# Patient Record
Sex: FEMALE | Race: OTHER | HISPANIC OR LATINO | URBAN - METROPOLITAN AREA
[De-identification: names, ages, dates, MRNs, and addresses within clinical notes are randomized per-mention and may not be internally consistent; named-entity substitution may affect disease eponyms.]

---

## 2017-10-12 ENCOUNTER — OUTPATIENT (OUTPATIENT)
Dept: OUTPATIENT SERVICES | Facility: HOSPITAL | Age: 72
LOS: 1 days | Discharge: HOME | End: 2017-10-12

## 2017-10-17 DIAGNOSIS — K44.9 DIAPHRAGMATIC HERNIA WITHOUT OBSTRUCTION OR GANGRENE: ICD-10-CM

## 2017-10-17 DIAGNOSIS — I10 ESSENTIAL (PRIMARY) HYPERTENSION: ICD-10-CM

## 2017-10-17 DIAGNOSIS — H33.8 OTHER RETINAL DETACHMENTS: ICD-10-CM

## 2017-10-17 DIAGNOSIS — M19.90 UNSPECIFIED OSTEOARTHRITIS, UNSPECIFIED SITE: ICD-10-CM

## 2017-10-17 DIAGNOSIS — Z79.82 LONG TERM (CURRENT) USE OF ASPIRIN: ICD-10-CM

## 2017-10-17 DIAGNOSIS — H35.341 MACULAR CYST, HOLE, OR PSEUDOHOLE, RIGHT EYE: ICD-10-CM

## 2018-04-19 ENCOUNTER — OUTPATIENT (OUTPATIENT)
Dept: OUTPATIENT SERVICES | Facility: HOSPITAL | Age: 73
LOS: 1 days | Discharge: HOME | End: 2018-04-19

## 2019-07-10 PROBLEM — Z00.00 ENCOUNTER FOR PREVENTIVE HEALTH EXAMINATION: Status: ACTIVE | Noted: 2019-07-10

## 2019-07-11 ENCOUNTER — TRANSCRIPTION ENCOUNTER (OUTPATIENT)
Age: 74
End: 2019-07-11

## 2019-07-11 ENCOUNTER — APPOINTMENT (OUTPATIENT)
Dept: PULMONOLOGY | Facility: CLINIC | Age: 74
End: 2019-07-11
Payer: MEDICARE

## 2019-07-11 VITALS
DIASTOLIC BLOOD PRESSURE: 65 MMHG | HEIGHT: 62 IN | HEART RATE: 57 BPM | BODY MASS INDEX: 26.5 KG/M2 | SYSTOLIC BLOOD PRESSURE: 147 MMHG | WEIGHT: 144 LBS | OXYGEN SATURATION: 95 %

## 2019-07-11 DIAGNOSIS — G47.00 INSOMNIA, UNSPECIFIED: ICD-10-CM

## 2019-07-11 DIAGNOSIS — Z78.9 OTHER SPECIFIED HEALTH STATUS: ICD-10-CM

## 2019-07-11 DIAGNOSIS — K46.9 UNSPECIFIED ABDOMINAL HERNIA W/OUT OBSTRUCTION OR GANGRENE: ICD-10-CM

## 2019-07-11 DIAGNOSIS — Z86.79 PERSONAL HISTORY OF OTHER DISEASES OF THE CIRCULATORY SYSTEM: ICD-10-CM

## 2019-07-11 DIAGNOSIS — R07.9 CHEST PAIN, UNSPECIFIED: ICD-10-CM

## 2019-07-11 PROCEDURE — 94060 EVALUATION OF WHEEZING: CPT | Mod: 59

## 2019-07-11 PROCEDURE — 99203 OFFICE O/P NEW LOW 30 MIN: CPT | Mod: 25

## 2019-07-11 PROCEDURE — 94729 DIFFUSING CAPACITY: CPT | Mod: 59

## 2019-07-11 PROCEDURE — 94727 GAS DIL/WSHOT DETER LNG VOL: CPT | Mod: 59

## 2019-07-11 NOTE — REVIEW OF SYSTEMS
[Chest Discomfort] : chest discomfort [Back Pain] : ~T back pain [Difficulty Initiating Sleep] : difficulty falling asleep [Difficulty Maintaining Sleep] : difficulty maintaining sleep [Negative] : Sleep Disorder

## 2019-07-11 NOTE — HISTORY OF PRESENT ILLNESS
[FreeTextEntry1] : The patient is a 73-year-old  lady with history of left posterior chest pain for the last 3-4 months. The pain is not related to exertion. It occurs sometimes during the day. She also has low back pain.\par She had a full cardiac evaluation including a thallium stress test done on 21 June. Patient is a nonsmoker. She is active. She does housework regularly.\par The daughter states the patient complains of insomnia. She goes to bed around 12 midnight and falls asleep at around 4 AM and sleeps till about 8 AM. She drinks several cups of coffee a day. Most of the coffee she drinks each in the evening hours.\par She has no history of dyspnea. There is no history of cough.\par She was a homemaker. There is no occupational history.\par

## 2019-07-11 NOTE — DISCUSSION/SUMMARY
[FreeTextEntry1] : Patient has chest wall pain mostly likely related to arthritis of the spine.\par Will do a chest x-ray.\par The patient was advised to try stretching and relaxation exercises. Patient is advised to try yoga.\par The patient has insomnia most likely related to excess caffeine intake. She was advised to cut out caffeine intake and take magnesium and melatonin at night.

## 2019-07-11 NOTE — PHYSICAL EXAM
[General Appearance - Well Developed] : well developed [Normal Appearance] : normal appearance [Well Groomed] : well groomed [General Appearance - Well Nourished] : well nourished [No Deformities] : no deformities [General Appearance - In No Acute Distress] : no acute distress [Normal Conjunctiva] : the conjunctiva exhibited no abnormalities [Eyelids - No Xanthelasma] : the eyelids demonstrated no xanthelasmas [Normal Oropharynx] : normal oropharynx [IV] : IV [Jugular Venous Distention Increased] : there was no jugular-venous distention [Neck Appearance] : the appearance of the neck was normal [Neck Cervical Mass (___cm)] : no neck mass was observed [Thyroid Diffuse Enlargement] : the thyroid was not enlarged [Thyroid Nodule] : there were no palpable thyroid nodules [Heart Rate And Rhythm] : heart rate and rhythm were normal [Heart Sounds] : normal S1 and S2 [Murmurs] : no murmurs present [Respiration, Rhythm And Depth] : normal respiratory rhythm and effort [Exaggerated Use Of Accessory Muscles For Inspiration] : no accessory muscle use [Auscultation Breath Sounds / Voice Sounds] : lungs were clear to auscultation bilaterally [Abdomen Tenderness] : non-tender [Abdomen Soft] : soft [Abdomen Mass (___ Cm)] : no abdominal mass palpated [Abnormal Walk] : normal gait [Nail Clubbing] : no clubbing of the fingernails [Gait - Sufficient For Exercise Testing] : the gait was sufficient for exercise testing [Petechial Hemorrhages (___cm)] : no petechial hemorrhages [Cyanosis, Localized] : no localized cyanosis [Skin Color & Pigmentation] : normal skin color and pigmentation [Skin Turgor] : normal skin turgor [] : no rash [Deep Tendon Reflexes (DTR)] : deep tendon reflexes were 2+ and symmetric [Sensation] : the sensory exam was normal to light touch and pinprick [No Focal Deficits] : no focal deficits [Oriented To Time, Place, And Person] : oriented to person, place, and time [Affect] : the affect was normal [Impaired Insight] : insight and judgment were intact

## 2019-07-11 NOTE — PROCEDURE
[FreeTextEntry1] : The pulmonary function testing done revealed restrictive impairment and mild reduction in DLCO.

## 2019-08-08 ENCOUNTER — APPOINTMENT (OUTPATIENT)
Dept: PULMONOLOGY | Facility: CLINIC | Age: 74
End: 2019-08-08

## 2022-09-01 ENCOUNTER — INPATIENT (INPATIENT)
Facility: HOSPITAL | Age: 77
LOS: 0 days | Discharge: HOME | DRG: 178 | End: 2022-09-02
Attending: INTERNAL MEDICINE | Admitting: INTERNAL MEDICINE
Payer: MEDICARE

## 2022-09-01 VITALS
OXYGEN SATURATION: 96 % | RESPIRATION RATE: 16 BRPM | DIASTOLIC BLOOD PRESSURE: 67 MMHG | HEIGHT: 62 IN | HEART RATE: 74 BPM | SYSTOLIC BLOOD PRESSURE: 182 MMHG | TEMPERATURE: 98 F | WEIGHT: 143.08 LBS

## 2022-09-01 DIAGNOSIS — K21.9 GASTRO-ESOPHAGEAL REFLUX DISEASE WITHOUT ESOPHAGITIS: ICD-10-CM

## 2022-09-01 DIAGNOSIS — Z29.9 ENCOUNTER FOR PROPHYLACTIC MEASURES, UNSPECIFIED: ICD-10-CM

## 2022-09-01 DIAGNOSIS — I10 ESSENTIAL (PRIMARY) HYPERTENSION: ICD-10-CM

## 2022-09-01 DIAGNOSIS — Z71.89 OTHER SPECIFIED COUNSELING: ICD-10-CM

## 2022-09-01 DIAGNOSIS — R07.9 CHEST PAIN, UNSPECIFIED: ICD-10-CM

## 2022-09-01 DIAGNOSIS — E78.5 HYPERLIPIDEMIA, UNSPECIFIED: ICD-10-CM

## 2022-09-01 DIAGNOSIS — U07.1 COVID-19: ICD-10-CM

## 2022-09-01 DIAGNOSIS — R07.89 OTHER CHEST PAIN: ICD-10-CM

## 2022-09-01 LAB
ALBUMIN SERPL ELPH-MCNC: 3.5 G/DL — SIGNIFICANT CHANGE UP (ref 3.5–5)
ALP SERPL-CCNC: 62 U/L — SIGNIFICANT CHANGE UP (ref 40–120)
ALT FLD-CCNC: 19 U/L DA — SIGNIFICANT CHANGE UP (ref 10–60)
ANION GAP SERPL CALC-SCNC: 6 MMOL/L — SIGNIFICANT CHANGE UP (ref 5–17)
AST SERPL-CCNC: 24 U/L — SIGNIFICANT CHANGE UP (ref 10–40)
BASOPHILS # BLD AUTO: 0.03 K/UL — SIGNIFICANT CHANGE UP (ref 0–0.2)
BASOPHILS NFR BLD AUTO: 0.4 % — SIGNIFICANT CHANGE UP (ref 0–2)
BILIRUB SERPL-MCNC: 0.4 MG/DL — SIGNIFICANT CHANGE UP (ref 0.2–1.2)
BUN SERPL-MCNC: 25 MG/DL — HIGH (ref 7–18)
CALCIUM SERPL-MCNC: 9.4 MG/DL — SIGNIFICANT CHANGE UP (ref 8.4–10.5)
CHLORIDE SERPL-SCNC: 107 MMOL/L — SIGNIFICANT CHANGE UP (ref 96–108)
CO2 SERPL-SCNC: 25 MMOL/L — SIGNIFICANT CHANGE UP (ref 22–31)
CREAT SERPL-MCNC: 0.91 MG/DL — SIGNIFICANT CHANGE UP (ref 0.5–1.3)
EGFR: 65 ML/MIN/1.73M2 — SIGNIFICANT CHANGE UP
EOSINOPHIL # BLD AUTO: 0.05 K/UL — SIGNIFICANT CHANGE UP (ref 0–0.5)
EOSINOPHIL NFR BLD AUTO: 0.6 % — SIGNIFICANT CHANGE UP (ref 0–6)
GLUCOSE SERPL-MCNC: 106 MG/DL — HIGH (ref 70–99)
HCT VFR BLD CALC: 39.2 % — SIGNIFICANT CHANGE UP (ref 34.5–45)
HGB BLD-MCNC: 13.1 G/DL — SIGNIFICANT CHANGE UP (ref 11.5–15.5)
IMM GRANULOCYTES NFR BLD AUTO: 0.6 % — SIGNIFICANT CHANGE UP (ref 0–1.5)
LYMPHOCYTES # BLD AUTO: 1.26 K/UL — SIGNIFICANT CHANGE UP (ref 1–3.3)
LYMPHOCYTES # BLD AUTO: 15.6 % — SIGNIFICANT CHANGE UP (ref 13–44)
MCHC RBC-ENTMCNC: 30.4 PG — SIGNIFICANT CHANGE UP (ref 27–34)
MCHC RBC-ENTMCNC: 33.4 GM/DL — SIGNIFICANT CHANGE UP (ref 32–36)
MCV RBC AUTO: 91 FL — SIGNIFICANT CHANGE UP (ref 80–100)
MONOCYTES # BLD AUTO: 0.44 K/UL — SIGNIFICANT CHANGE UP (ref 0–0.9)
MONOCYTES NFR BLD AUTO: 5.4 % — SIGNIFICANT CHANGE UP (ref 2–14)
NEUTROPHILS # BLD AUTO: 6.25 K/UL — SIGNIFICANT CHANGE UP (ref 1.8–7.4)
NEUTROPHILS NFR BLD AUTO: 77.4 % — HIGH (ref 43–77)
NRBC # BLD: 0 /100 WBCS — SIGNIFICANT CHANGE UP (ref 0–0)
PLATELET # BLD AUTO: 261 K/UL — SIGNIFICANT CHANGE UP (ref 150–400)
POTASSIUM SERPL-MCNC: 4.6 MMOL/L — SIGNIFICANT CHANGE UP (ref 3.5–5.3)
POTASSIUM SERPL-SCNC: 4.6 MMOL/L — SIGNIFICANT CHANGE UP (ref 3.5–5.3)
PROT SERPL-MCNC: 7.4 G/DL — SIGNIFICANT CHANGE UP (ref 6–8.3)
RBC # BLD: 4.31 M/UL — SIGNIFICANT CHANGE UP (ref 3.8–5.2)
RBC # FLD: 12.8 % — SIGNIFICANT CHANGE UP (ref 10.3–14.5)
SARS-COV-2 RNA SPEC QL NAA+PROBE: DETECTED
SODIUM SERPL-SCNC: 138 MMOL/L — SIGNIFICANT CHANGE UP (ref 135–145)
TROPONIN I, HIGH SENSITIVITY RESULT: 8.2 NG/L — SIGNIFICANT CHANGE UP
TROPONIN I, HIGH SENSITIVITY RESULT: 8.9 NG/L — SIGNIFICANT CHANGE UP
WBC # BLD: 8.08 K/UL — SIGNIFICANT CHANGE UP (ref 3.8–10.5)
WBC # FLD AUTO: 8.08 K/UL — SIGNIFICANT CHANGE UP (ref 3.8–10.5)

## 2022-09-01 PROCEDURE — 71045 X-RAY EXAM CHEST 1 VIEW: CPT | Mod: 26

## 2022-09-01 PROCEDURE — 74174 CTA ABD&PLVS W/CONTRAST: CPT | Mod: 26,MA

## 2022-09-01 PROCEDURE — 99285 EMERGENCY DEPT VISIT HI MDM: CPT

## 2022-09-01 PROCEDURE — 71275 CT ANGIOGRAPHY CHEST: CPT | Mod: 26,MA

## 2022-09-01 RX ORDER — VALSARTAN 80 MG/1
320 TABLET ORAL DAILY
Refills: 0 | Status: DISCONTINUED | OUTPATIENT
Start: 2022-09-02 | End: 2022-09-02

## 2022-09-01 RX ORDER — ASPIRIN/CALCIUM CARB/MAGNESIUM 324 MG
81 TABLET ORAL DAILY
Refills: 0 | Status: DISCONTINUED | OUTPATIENT
Start: 2022-09-01 | End: 2022-09-02

## 2022-09-01 RX ORDER — AMLODIPINE BESYLATE 2.5 MG/1
1 TABLET ORAL
Qty: 0 | Refills: 0 | DISCHARGE

## 2022-09-01 RX ORDER — PANTOPRAZOLE SODIUM 20 MG/1
40 TABLET, DELAYED RELEASE ORAL
Refills: 0 | Status: DISCONTINUED | OUTPATIENT
Start: 2022-09-01 | End: 2022-09-02

## 2022-09-01 RX ORDER — AMLODIPINE BESYLATE 2.5 MG/1
5 TABLET ORAL DAILY
Refills: 0 | Status: DISCONTINUED | OUTPATIENT
Start: 2022-09-01 | End: 2022-09-02

## 2022-09-01 RX ORDER — ENOXAPARIN SODIUM 100 MG/ML
40 INJECTION SUBCUTANEOUS ONCE
Refills: 0 | Status: COMPLETED | OUTPATIENT
Start: 2022-09-01 | End: 2022-09-01

## 2022-09-01 RX ORDER — VALSARTAN 80 MG/1
1 TABLET ORAL
Qty: 0 | Refills: 0 | DISCHARGE

## 2022-09-01 RX ORDER — HYDROCHLOROTHIAZIDE 25 MG
25 TABLET ORAL DAILY
Refills: 0 | Status: DISCONTINUED | OUTPATIENT
Start: 2022-09-01 | End: 2022-09-02

## 2022-09-01 RX ORDER — METOPROLOL TARTRATE 50 MG
25 TABLET ORAL
Refills: 0 | Status: DISCONTINUED | OUTPATIENT
Start: 2022-09-01 | End: 2022-09-02

## 2022-09-01 RX ORDER — ACETAMINOPHEN 500 MG
650 TABLET ORAL EVERY 6 HOURS
Refills: 0 | Status: DISCONTINUED | OUTPATIENT
Start: 2022-09-01 | End: 2022-09-02

## 2022-09-01 RX ORDER — ATORVASTATIN CALCIUM 80 MG/1
40 TABLET, FILM COATED ORAL AT BEDTIME
Refills: 0 | Status: DISCONTINUED | OUTPATIENT
Start: 2022-09-01 | End: 2022-09-02

## 2022-09-01 RX ADMIN — ENOXAPARIN SODIUM 40 MILLIGRAM(S): 100 INJECTION SUBCUTANEOUS at 19:30

## 2022-09-01 RX ADMIN — PANTOPRAZOLE SODIUM 40 MILLIGRAM(S): 20 TABLET, DELAYED RELEASE ORAL at 19:31

## 2022-09-01 RX ADMIN — ATORVASTATIN CALCIUM 40 MILLIGRAM(S): 80 TABLET, FILM COATED ORAL at 22:39

## 2022-09-01 RX ADMIN — Medication 81 MILLIGRAM(S): at 19:30

## 2022-09-01 NOTE — H&P ADULT - PROBLEM SELECTOR PLAN 4
H/o small hiatal hernia, experiences heart burn on and off at home  Not taking any medications at home for heartburn  C/w Protonix 40mg H/o small hiatal hernia, experiences heart burn on and off at home  CT showing small pancreatic cyst  Not taking any medications at home for heartburn  C/w Protonix 40mg  f/u lipase  maalox

## 2022-09-01 NOTE — H&P ADULT - PROBLEM SELECTOR PLAN 2
Reports history of HLD  Not on any medications at home Reports history of HLD  Not on any medications at home  start atorva 40  f/u lipid panel

## 2022-09-01 NOTE — H&P ADULT - HISTORY OF PRESENT ILLNESS
This is a 77 y/o female w/ pmhx of HTN, HLD, GERD, small hiatal hernia, p/w chest pain. The patient had interscapular pain which started on 8/31/22 at 11 PM. She woke up this morning (9/1/22) with burning chest pain, diaphoresis, and palpitations. Pain is aggravated by lying down. It is not aggravated by exertion. Pain is non radiating. No alleviating factors present. Headache associated with the pain. Pt has never experienced such symptoms before. Pt denies any chills, fevers, nausea, vomiting, SOB, dizziness, weakness, diarrhea, constipation dysuria, or numbness/tingling.  75 y/o female, from home, ambu w/ out assist w/ pmhx of HTN, HLD, GERD, small hiatal hernia, p/w chest pain. The patient had interscapular pain which started on 8/31/22 at 11 PM. She woke up this morning (9/1/22) with burning chest pain, diaphoresis, and palpitations. Pain is aggravated by lying down. It is not aggravated by exertion. Pain is non radiating. No alleviating factors present. Headache associated with the pain. Pt has never experienced such symptoms before. Pt denies any chills, fevers, nausea, vomiting, SOB, dizziness, weakness, diarrhea, constipation dysuria, or numbness/tingling.

## 2022-09-01 NOTE — ED PROVIDER NOTE - OBJECTIVE STATEMENT
76 year old patient with pmhx of hld and htn called ambulance for intermittent palpitations and some chest pain radiating to back. Recalls symptoms lasted last night 11 pm. Feeling fine now in ED. Dr Joe Meza for cardiologist. TWYLA.

## 2022-09-01 NOTE — H&P ADULT - PROBLEM SELECTOR PLAN 1
Pt presents with a burning, non-radiating chest pain. not worse with exertion. had back px last night which self resolved.  Ddx. R/o ACS vs GERD   CT chest/ aorta: No aortic dissection  CT angio: Coronary atherosclerosis  - EKG NSR  - Trend troponin to peak  - F/u Echo  - Monitor on Tele for ACS  Cardio Dr. Gonzalez Pt presents with a burning, non-radiating chest pain, not worse with exertion. had back px last night which self resolved  Ddx. R/o ACS vs GERD vs aortic dissection  CT chest/ aorta: No aortic dissection  CT angio: Coronary atherosclerosis  - EKG NSR  - Trend troponin to peak  - F/u Echo  - Monitor on Tele  Cardio Dr. Gonzalez Pt presents with a burning, non-radiating chest pain, not worse with exertion. had back px last night which self resolved  Ddx. R/o ACS vs GERD vs aortic dissection  CT chest/ aorta: No aortic dissection  CT angio: Coronary atherosclerosis  - EKG NSR  - Trend troponin to peak  - F/u Echo  - Monitor on Tele  - f/u lipid and a1c, DASH diet  Cardio Dr. Gonzalez

## 2022-09-01 NOTE — H&P ADULT - ATTENDING COMMENTS
75 y/o female, from home, ambu w/ out assist w/ pmhx of HTN, HLD, GERD, small hiatal hernia, p/w chest pain. The patient had interscapular pain which started on 8/31/22 at 11 PM. She woke up this morning (9/1/22) with burning chest pain, diaphoresis, and palpitations. Pain is aggravated by lying down. It is not aggravated by exertion. Pain is non radiating. No alleviating factors present. Headache associated with the pain. Pt has never experienced such symptoms before. Pt denies any chills, fevers, nausea, vomiting, SOB, dizziness, weakness, diarrhea, constipation dysuria, or numbness/tingling.      assessment   --- chest pain,  r/o acs, pe r/o, pos acute gastritis, esophagitis, r/o pud, pancreatic cyst, covid 19 positive, h/o HTN, HLD, GERD, small hiatal hernia    plan  --  adm to tele, acs protocol, lopressor, aspirin, statin, hold rendesivir and decadron since O2 > 94 on ra and pt afebrile, vit c, vit d, zinc, cont preadmit home meds, gi and dvt prophylaxis    cbc, bmp, mg, phos, lipid, tsh, ce q8 x3    airborne and contact isolation    echo      cardio cons  gi cons

## 2022-09-01 NOTE — PATIENT PROFILE ADULT - FALL HARM RISK - UNIVERSAL INTERVENTIONS
Bed in lowest position, wheels locked, appropriate side rails in place/Call bell, personal items and telephone in reach/Instruct patient to call for assistance before getting out of bed or chair/Non-slip footwear when patient is out of bed/Waka to call system/Physically safe environment - no spills, clutter or unnecessary equipment/Purposeful Proactive Rounding/Room/bathroom lighting operational, light cord in reach

## 2022-09-01 NOTE — ED PROVIDER NOTE - PROGRESS NOTE DETAILS
phil strange md for dr ervin, reangie for med admit - they will see pt edvin. discussed the case with the admitting MD who accepts the patient for admission

## 2022-09-01 NOTE — ED ADULT NURSE NOTE - NSFALLRSKUNASSIST_ED_ALL_ED
Pt had 2 periods in February and no period in March. Pt is concerned since her periods have always been regular. Pt had 3 negative home pregnancy tests and is requesting a serum preg test,stating she had a negative home preg test with last pregnancy. Lab is ordered. Pt states she is not trying to conceive , but does not wish to use contraception. Pt will go to [facility].   no

## 2022-09-01 NOTE — H&P ADULT - PROBLEM SELECTOR PLAN 5
Covid + (9/1)  Asymptomatic  Not requiring Rem/dec Covid + (9/1)  Asymptomatic, on RA  unkwn vax satus  No indication for Rem/dec

## 2022-09-01 NOTE — ED PROVIDER NOTE - NS_ATTENDINGSCRIBE_ED_ALL_ED
Pt notified via emsg   I personally performed the service described in the documentation recorded by the scribe in my presence, and it accurately and completely records my words and actions.

## 2022-09-01 NOTE — H&P ADULT - NSHPPHYSICALEXAM_GEN_ALL_CORE
GENERAL: NAD, mildly obese  HEAD:  Atraumatic, Normocephalic  EYES: EOMI, PERRLA, conjunctiva and sclera clear  NECK: Supple, No JVD  CHEST/LUNG: Clear to auscultation bilaterally; No wheeze  HEART: Regular rate and rhythm; No murmurs, rubs, or gallops  ABDOMEN: Soft, Nontender, Nondistended; Bowel sounds present  EXTREMITIES:  2+ Peripheral Pulses, No clubbing, cyanosis, or edema  PSYCH: AAOx3  NEUROLOGY: non-focal  SKIN: No rashes or lesions

## 2022-09-01 NOTE — H&P ADULT - ASSESSMENT
This is a 75 y/o female w/ pmhx of HTN, HLD, GERD, small hiatal hernia, p/w chest pain. Admitted for r/o ACS.

## 2022-09-01 NOTE — H&P ADULT - NSHPLABSRESULTS_GEN_ALL_CORE
LABS:                        13.1   8.08  )-----------( 261      ( 01 Sep 2022 14:58 )             39.2     09-01    138  |  107  |  25<H>  ----------------------------<  106<H>  4.6   |  25  |  0.91    Ca    9.4      01 Sep 2022 14:58    TPro  7.4  /  Alb  3.5  /  TBili  0.4  /  DBili  x   /  AST  24  /  ALT  19  /  AlkPhos  62  09-01        LIVER FUNCTIONS - ( 01 Sep 2022 14:58 )  Alb: 3.5 g/dL / Pro: 7.4 g/dL / ALK PHOS: 62 U/L / ALT: 19 U/L DA / AST: 24 U/L / GGT: x           EKG: NSR no ST elevation/depression. no TWI    < from: CT Angio Chest Aorta w/wo IV Cont (09.01.22 @ 17:44) >      IMPRESSION:    No aortic dissection.    Coronary atherosclerosis.    Nonspecific 0.8 cm cystic lesion within the pancreatic body.    --- End of Report ---    < end of copied text >

## 2022-09-01 NOTE — ED PROVIDER NOTE - CARE PLAN
1 Principal Discharge DX:	Chest discomfort  Secondary Diagnosis:	2019 novel coronavirus disease (COVID-19)  Secondary Diagnosis:	Lesion of pancreas

## 2022-09-01 NOTE — H&P ADULT - PROBLEM SELECTOR PLAN 3
Pt takes metoprolol succinate, amlodipine, chlorthalidone, valsartan, hydrochlorothiazide  Continue with home medications Pt takes metoprolol succinate, amlodipine, chlorthalidone, valsartan, hydrochlorothiazide  home medications unusual, please f.u w. cardiologist  will continue:  metop tart 25 BID, amlod 5 qd, hctzd 25 qd, valsartan 325 qd  w/ hold parameters

## 2022-09-02 ENCOUNTER — TRANSCRIPTION ENCOUNTER (OUTPATIENT)
Age: 77
End: 2022-09-02

## 2022-09-02 VITALS
RESPIRATION RATE: 17 BRPM | SYSTOLIC BLOOD PRESSURE: 128 MMHG | OXYGEN SATURATION: 96 % | TEMPERATURE: 98 F | HEART RATE: 58 BPM | DIASTOLIC BLOOD PRESSURE: 69 MMHG

## 2022-09-02 LAB
A1C WITH ESTIMATED AVERAGE GLUCOSE RESULT: 5.6 % — SIGNIFICANT CHANGE UP (ref 4–5.6)
ALBUMIN SERPL ELPH-MCNC: 3.2 G/DL — LOW (ref 3.5–5)
ALP SERPL-CCNC: 56 U/L — SIGNIFICANT CHANGE UP (ref 40–120)
ALT FLD-CCNC: 17 U/L DA — SIGNIFICANT CHANGE UP (ref 10–60)
ANION GAP SERPL CALC-SCNC: 7 MMOL/L — SIGNIFICANT CHANGE UP (ref 5–17)
AST SERPL-CCNC: 13 U/L — SIGNIFICANT CHANGE UP (ref 10–40)
BILIRUB SERPL-MCNC: 0.4 MG/DL — SIGNIFICANT CHANGE UP (ref 0.2–1.2)
BUN SERPL-MCNC: 25 MG/DL — HIGH (ref 7–18)
CALCIUM SERPL-MCNC: 9.5 MG/DL — SIGNIFICANT CHANGE UP (ref 8.4–10.5)
CHLORIDE SERPL-SCNC: 106 MMOL/L — SIGNIFICANT CHANGE UP (ref 96–108)
CHOLEST SERPL-MCNC: 178 MG/DL — SIGNIFICANT CHANGE UP
CO2 SERPL-SCNC: 26 MMOL/L — SIGNIFICANT CHANGE UP (ref 22–31)
CREAT SERPL-MCNC: 0.94 MG/DL — SIGNIFICANT CHANGE UP (ref 0.5–1.3)
EGFR: 62 ML/MIN/1.73M2 — SIGNIFICANT CHANGE UP
ESTIMATED AVERAGE GLUCOSE: 114 MG/DL — SIGNIFICANT CHANGE UP (ref 68–114)
GLUCOSE SERPL-MCNC: 93 MG/DL — SIGNIFICANT CHANGE UP (ref 70–99)
HCT VFR BLD CALC: 37.6 % — SIGNIFICANT CHANGE UP (ref 34.5–45)
HDLC SERPL-MCNC: 50 MG/DL — LOW
HGB BLD-MCNC: 12.3 G/DL — SIGNIFICANT CHANGE UP (ref 11.5–15.5)
LIDOCAIN IGE QN: 105 U/L — SIGNIFICANT CHANGE UP (ref 73–393)
LIPID PNL WITH DIRECT LDL SERPL: 103 MG/DL — HIGH
MAGNESIUM SERPL-MCNC: 2 MG/DL — SIGNIFICANT CHANGE UP (ref 1.6–2.6)
MCHC RBC-ENTMCNC: 30.2 PG — SIGNIFICANT CHANGE UP (ref 27–34)
MCHC RBC-ENTMCNC: 32.7 GM/DL — SIGNIFICANT CHANGE UP (ref 32–36)
MCV RBC AUTO: 92.4 FL — SIGNIFICANT CHANGE UP (ref 80–100)
NON HDL CHOLESTEROL: 128 MG/DL — SIGNIFICANT CHANGE UP
NRBC # BLD: 0 /100 WBCS — SIGNIFICANT CHANGE UP (ref 0–0)
PHOSPHATE SERPL-MCNC: 3.7 MG/DL — SIGNIFICANT CHANGE UP (ref 2.5–4.5)
PLATELET # BLD AUTO: 262 K/UL — SIGNIFICANT CHANGE UP (ref 150–400)
POTASSIUM SERPL-MCNC: 4.2 MMOL/L — SIGNIFICANT CHANGE UP (ref 3.5–5.3)
POTASSIUM SERPL-SCNC: 4.2 MMOL/L — SIGNIFICANT CHANGE UP (ref 3.5–5.3)
PROT SERPL-MCNC: 6.7 G/DL — SIGNIFICANT CHANGE UP (ref 6–8.3)
RBC # BLD: 4.07 M/UL — SIGNIFICANT CHANGE UP (ref 3.8–5.2)
RBC # FLD: 12.9 % — SIGNIFICANT CHANGE UP (ref 10.3–14.5)
SODIUM SERPL-SCNC: 139 MMOL/L — SIGNIFICANT CHANGE UP (ref 135–145)
TRIGL SERPL-MCNC: 127 MG/DL — SIGNIFICANT CHANGE UP
WBC # BLD: 6.26 K/UL — SIGNIFICANT CHANGE UP (ref 3.8–10.5)
WBC # FLD AUTO: 6.26 K/UL — SIGNIFICANT CHANGE UP (ref 3.8–10.5)

## 2022-09-02 PROCEDURE — 84100 ASSAY OF PHOSPHORUS: CPT

## 2022-09-02 PROCEDURE — 87635 SARS-COV-2 COVID-19 AMP PRB: CPT

## 2022-09-02 PROCEDURE — 96372 THER/PROPH/DIAG INJ SC/IM: CPT

## 2022-09-02 PROCEDURE — 97162 PT EVAL MOD COMPLEX 30 MIN: CPT

## 2022-09-02 PROCEDURE — 84484 ASSAY OF TROPONIN QUANT: CPT

## 2022-09-02 PROCEDURE — 99285 EMERGENCY DEPT VISIT HI MDM: CPT | Mod: 25

## 2022-09-02 PROCEDURE — 80061 LIPID PANEL: CPT

## 2022-09-02 PROCEDURE — 83735 ASSAY OF MAGNESIUM: CPT

## 2022-09-02 PROCEDURE — 83690 ASSAY OF LIPASE: CPT

## 2022-09-02 PROCEDURE — 93005 ELECTROCARDIOGRAM TRACING: CPT

## 2022-09-02 PROCEDURE — 80053 COMPREHEN METABOLIC PANEL: CPT

## 2022-09-02 PROCEDURE — 83036 HEMOGLOBIN GLYCOSYLATED A1C: CPT

## 2022-09-02 PROCEDURE — 85027 COMPLETE CBC AUTOMATED: CPT

## 2022-09-02 PROCEDURE — 36415 COLL VENOUS BLD VENIPUNCTURE: CPT

## 2022-09-02 PROCEDURE — 71045 X-RAY EXAM CHEST 1 VIEW: CPT

## 2022-09-02 PROCEDURE — 74174 CTA ABD&PLVS W/CONTRAST: CPT | Mod: MA

## 2022-09-02 PROCEDURE — 85025 COMPLETE CBC W/AUTO DIFF WBC: CPT

## 2022-09-02 PROCEDURE — 71275 CT ANGIOGRAPHY CHEST: CPT | Mod: MA

## 2022-09-02 RX ORDER — ATORVASTATIN CALCIUM 80 MG/1
1 TABLET, FILM COATED ORAL
Qty: 30 | Refills: 0
Start: 2022-09-02 | End: 2022-10-01

## 2022-09-02 RX ORDER — INFLUENZA VIRUS VACCINE 15; 15; 15; 15 UG/.5ML; UG/.5ML; UG/.5ML; UG/.5ML
0.7 SUSPENSION INTRAMUSCULAR ONCE
Refills: 0 | Status: COMPLETED | OUTPATIENT
Start: 2022-09-02 | End: 2022-09-02

## 2022-09-02 RX ORDER — ATORVASTATIN CALCIUM 80 MG/1
1 TABLET, FILM COATED ORAL
Qty: 0 | Refills: 0 | DISCHARGE
Start: 2022-09-02

## 2022-09-02 RX ORDER — PANTOPRAZOLE SODIUM 20 MG/1
1 TABLET, DELAYED RELEASE ORAL
Qty: 0 | Refills: 0 | DISCHARGE
Start: 2022-09-02

## 2022-09-02 RX ORDER — PANTOPRAZOLE SODIUM 20 MG/1
1 TABLET, DELAYED RELEASE ORAL
Qty: 30 | Refills: 0
Start: 2022-09-02 | End: 2022-10-01

## 2022-09-02 RX ORDER — CHLORTHALIDONE 50 MG
1 TABLET ORAL
Qty: 0 | Refills: 0 | DISCHARGE

## 2022-09-02 RX ADMIN — Medication 25 MILLIGRAM(S): at 06:28

## 2022-09-02 RX ADMIN — Medication 81 MILLIGRAM(S): at 12:07

## 2022-09-02 RX ADMIN — Medication 25 MILLIGRAM(S): at 06:29

## 2022-09-02 RX ADMIN — VALSARTAN 320 MILLIGRAM(S): 80 TABLET ORAL at 06:55

## 2022-09-02 RX ADMIN — AMLODIPINE BESYLATE 5 MILLIGRAM(S): 2.5 TABLET ORAL at 06:28

## 2022-09-02 NOTE — CONSULT NOTE ADULT - SUBJECTIVE AND OBJECTIVE BOX
PULMONARY CONSULT NOTE    LAWSON JENKINS  [id number]    Patient is a 77y old  Female who presents with a chief complaint of Chest pain (02 Sep 2022 08:56)    History of Present Illness:  Reason for Admission: Chest pain  History of Present Illness:   75 y/o female, from home, ambu w/ out assist w/ pmhx of HTN, HLD, GERD, small hiatal hernia, p/w chest pain. The patient had interscapular pain which started on 8/31/22 at 11 PM. She woke up this morning (9/1/22) with burning chest pain, diaphoresis, and palpitations. Pain is aggravated by lying down. It is not aggravated by exertion. Pain is non radiating. No alleviating factors present. Headache associated with the pain. Pt has never experienced such symptoms before. Pt denies any chills, fevers, nausea, vomiting, SOB, dizziness, weakness, diarrhea, constipation dysuria, or numbness/tingling.      HISTORY OF PRESENT ILLNESS: As above, patient is awake, alert, laying comfortable in bed, not in acute distress at . Patient is on isolation due to COVID-19 + infection.     MEDICATIONS  (STANDING):  amLODIPine   Tablet 5 milliGRAM(s) Oral daily  aspirin enteric coated 81 milliGRAM(s) Oral daily  atorvastatin 40 milliGRAM(s) Oral at bedtime  hydrochlorothiazide 25 milliGRAM(s) Oral daily  influenza  Vaccine (HIGH DOSE) 0.7 milliLiter(s) IntraMuscular once  metoprolol tartrate 25 milliGRAM(s) Oral two times a day  pantoprazole    Tablet 40 milliGRAM(s) Oral before breakfast  valsartan 320 milliGRAM(s) Oral daily      MEDICATIONS  (PRN):  acetaminophen     Tablet .. 650 milliGRAM(s) Oral every 6 hours PRN Temp greater or equal to 38C (100.4F), Mild Pain (1 - 3), Moderate Pain (4 - 6)  aluminum hydroxide/magnesium hydroxide/simethicone Suspension 30 milliLiter(s) Oral every 6 hours PRN Dyspepsia      Allergies    No Known Allergies    Intolerances        PAST MEDICAL & SURGICAL HISTORY:  Hypertension      HTN (hypertension)      No significant past surgical history      No significant past surgical history          FAMILY HISTORY:      SOCIAL HISTORY  Smoking History:     REVIEW OF SYSTEMS:    CONSTITUTIONAL:  No fevers, chills, sweats    HEENT:  Eyes:  No diplopia or blurred vision. ENT:  No earache, sore throat or runny nose.    CARDIOVASCULAR:  No pressure, squeezing, tightness, or heaviness about the chest; no palpitations.    RESPIRATORY:  Per HPI    GASTROINTESTINAL:  No abdominal pain, nausea, vomiting or diarrhea.    GENITOURINARY:  No dysuria, frequency or urgency.    NEUROLOGIC:  No paresthesias, fasciculations, seizures or weakness.    PSYCHIATRIC:  No disorder of thought or mood.    Vital Signs Last 24 Hrs  T(C): 36.7 (02 Sep 2022 07:58), Max: 36.9 (02 Sep 2022 05:00)  T(F): 98 (02 Sep 2022 07:58), Max: 98.4 (02 Sep 2022 05:00)  HR: 72 (02 Sep 2022 10:34) (60 - 76)  BP: 143/62 (02 Sep 2022 10:34) (137/73 - 182/67)  BP(mean): --  RR: 16 (02 Sep 2022 07:58) (16 - 18)  SpO2: 96% (02 Sep 2022 10:34) (93% - 97%)    Parameters below as of 02 Sep 2022 10:34  Patient On (Oxygen Delivery Method): room air      I&O's Detail      PHYSICAL EXAMINATION:    GENERAL: The patient is a well-developed, well-nourished _____in no apparent distress.     HEENT: Head is normocephalic and atraumatic. Extraocular muscles are intact. Mucous membranes are moist.     NECK: Supple.     LUNGS: Clear to auscultation without wheezing, rales, or rhonchi. Respirations unlabored    HEART: Regular rate and rhythm without murmur.    ABDOMEN: Soft, nontender, and nondistended.  No hepatosplenomegaly is noted.    EXTREMITIES: Without any cyanosis, clubbing, rash, lesions or edema.    NEUROLOGIC: Grossly intact.      LABS:                        12.3   6.26  )-----------( 262      ( 02 Sep 2022 06:10 )             37.6     09-02    139  |  106  |  25<H>  ----------------------------<  93  4.2   |  26  |  0.94    Ca    9.5      02 Sep 2022 06:10  Phos  3.7     09-02  Mg     2.0     09-02    TPro  6.7  /  Alb  3.2<L>  /  TBili  0.4  /  DBili  x   /  AST  13  /  ALT  17  /  AlkPhos  56  09-02      COVID-19 PCR (09.01.22 @ 14:58)   COVID-19 PCR: Detected: EUA/IVD    Troponin I, High Sensitivity (09.01.22 @ 14:58)   Troponin I, High Sensitivity Result: 8.2                  MICROBIOLOGY:    RADIOLOGY & ADDITIONAL STUDIES:    CXR:  < from: Xray Chest 1 View- PORTABLE-Urgent (09.01.22 @ 13:01) >  INTERPRETATION:  AP semierect chest on September 1, 2022 at 12:46 PM.   Patient has chest pain.    Heart magnified by technique.    Lungsare clear.    Chest is similar to November 18, 2016.    IMPRESSION: No acute finding or change.    --- End of Report ---    < end of copied text >        Ct scan chest;    ekg;    echo:    < from: CT Angio Chest Aorta w/wo IV Cont (09.01.22 @ 17:44) >  IMPRESSION:    No aortic dissection.    Coronary atherosclerosis.    Nonspecific 0.8 cm cystic lesion within the pancreatic body.    --- End of Report ---            SHERIN DEJESUS MD; Attending Radiologist  This document has been electronically signed. Sep  1 2022  5:57PM    < end of copied text > PULMONARY CONSULT NOTE    LAWSON JENKINS  [id number]    Patient is a 77y old  Female who presents with a chief complaint of Chest pain (02 Sep 2022 08:56)    History of Present Illness:  Reason for Admission: Chest pain  History of Present Illness:   77 y/o female, from home, ambu w/ out assist w/ pmhx of HTN, HLD, GERD, small hiatal hernia, p/w chest pain. The patient had interscapular pain which started on 8/31/22 at 11 PM. She woke up this morning (9/1/22) with burning chest pain, diaphoresis, and palpitations. Pain is aggravated by lying down. It is not aggravated by exertion. Pain is non radiating. No alleviating factors present. Headache associated with the pain. Pt has never experienced such symptoms before. Pt denies any chills, fevers, nausea, vomiting, SOB, dizziness, weakness, diarrhea, constipation dysuria, or numbness/tingling.      HISTORY OF PRESENT ILLNESS: As above. Patient is awake, alert, laying comfortable in bed, not in acute distress at . Patient is on isolation due to COVID-19 + infection.     MEDICATIONS  (STANDING):  amLODIPine   Tablet 5 milliGRAM(s) Oral daily  aspirin enteric coated 81 milliGRAM(s) Oral daily  atorvastatin 40 milliGRAM(s) Oral at bedtime  hydrochlorothiazide 25 milliGRAM(s) Oral daily  influenza  Vaccine (HIGH DOSE) 0.7 milliLiter(s) IntraMuscular once  metoprolol tartrate 25 milliGRAM(s) Oral two times a day  pantoprazole    Tablet 40 milliGRAM(s) Oral before breakfast  valsartan 320 milliGRAM(s) Oral daily      MEDICATIONS  (PRN):  acetaminophen     Tablet .. 650 milliGRAM(s) Oral every 6 hours PRN Temp greater or equal to 38C (100.4F), Mild Pain (1 - 3), Moderate Pain (4 - 6)  aluminum hydroxide/magnesium hydroxide/simethicone Suspension 30 milliLiter(s) Oral every 6 hours PRN Dyspepsia      Allergies    No Known Allergies    Intolerances        PAST MEDICAL & SURGICAL HISTORY:  Hypertension      HTN (hypertension)      No significant past surgical history      No significant past surgical history          FAMILY HISTORY:      SOCIAL HISTORY  Smoking History:     REVIEW OF SYSTEMS:    CONSTITUTIONAL:  No fevers, chills, sweats    HEENT:  Eyes:  No diplopia or blurred vision. ENT:  No earache, sore throat or runny nose.    CARDIOVASCULAR:  No pressure, squeezing, tightness, or heaviness about the chest; no palpitations.    RESPIRATORY:  Per HPI    GASTROINTESTINAL:  No abdominal pain, nausea, vomiting or diarrhea.    GENITOURINARY:  No dysuria, frequency or urgency.    NEUROLOGIC:  No paresthesias, fasciculations, seizures or weakness.    PSYCHIATRIC:  No disorder of thought or mood.    Vital Signs Last 24 Hrs  T(C): 36.7 (02 Sep 2022 07:58), Max: 36.9 (02 Sep 2022 05:00)  T(F): 98 (02 Sep 2022 07:58), Max: 98.4 (02 Sep 2022 05:00)  HR: 72 (02 Sep 2022 10:34) (60 - 76)  BP: 143/62 (02 Sep 2022 10:34) (137/73 - 182/67)  BP(mean): --  RR: 16 (02 Sep 2022 07:58) (16 - 18)  SpO2: 96% (02 Sep 2022 10:34) (93% - 97%)    Parameters below as of 02 Sep 2022 10:34  Patient On (Oxygen Delivery Method): room air      I&O's Detail      PHYSICAL EXAMINATION:    GENERAL: The patient is a well-developed, well-nourished _____in no apparent distress.     HEENT: Head is normocephalic and atraumatic. Extraocular muscles are intact. Mucous membranes are moist.     NECK: Supple.     LUNGS: Clear to auscultation without wheezing, rales, or rhonchi. Respirations unlabored    HEART: Regular rate and rhythm without murmur.    ABDOMEN: Soft, nontender, and nondistended.  No hepatosplenomegaly is noted.    EXTREMITIES: Without any cyanosis, clubbing, rash, lesions or edema.    NEUROLOGIC: Grossly intact.      LABS:                        12.3   6.26  )-----------( 262      ( 02 Sep 2022 06:10 )             37.6     09-02    139  |  106  |  25<H>  ----------------------------<  93  4.2   |  26  |  0.94    Ca    9.5      02 Sep 2022 06:10  Phos  3.7     09-02  Mg     2.0     09-02    TPro  6.7  /  Alb  3.2<L>  /  TBili  0.4  /  DBili  x   /  AST  13  /  ALT  17  /  AlkPhos  56  09-02      COVID-19 PCR (09.01.22 @ 14:58)   COVID-19 PCR: Detected: EUA/IVD    Troponin I, High Sensitivity (09.01.22 @ 14:58)   Troponin I, High Sensitivity Result: 8.2                  MICROBIOLOGY:    RADIOLOGY & ADDITIONAL STUDIES:    CXR:  < from: Xray Chest 1 View- PORTABLE-Urgent (09.01.22 @ 13:01) >  INTERPRETATION:  AP semierect chest on September 1, 2022 at 12:46 PM.   Patient has chest pain.    Heart magnified by technique.    Lungsare clear.    Chest is similar to November 18, 2016.    IMPRESSION: No acute finding or change.    --- End of Report ---    < end of copied text >        Ct scan chest;    ekg;    echo:    < from: CT Angio Chest Aorta w/wo IV Cont (09.01.22 @ 17:44) >  IMPRESSION:    No aortic dissection.    Coronary atherosclerosis.    Nonspecific 0.8 cm cystic lesion within the pancreatic body.    --- End of Report ---            SHERIN DEJESUS MD; Attending Radiologist  This document has been electronically signed. Sep  1 2022  5:57PM    < end of copied text >

## 2022-09-02 NOTE — PHYSICAL THERAPY INITIAL EVALUATION ADULT - GENERAL OBSERVATIONS, REHAB EVAL
Pt seen supine in bed AAOX3, able to follow simple commands, denied any c/o pain/discomfort, was cooperative during assessment.  #[id number] assisted with translation

## 2022-09-02 NOTE — PROGRESS NOTE ADULT - SUBJECTIVE AND OBJECTIVE BOX
Patient is a 77y old  Female who presents with a chief complaint of Chest pain (01 Sep 2022 19:53)    pt seen in tele [ x ], reg med floor [   ], bed [ x ], chair at bedside [   ], a+o x3 [ x ], lethargic [  ],  nad [ x ]    Allergies    No Known Allergies        Vitals    T(F): 98 (09-02-22 @ 07:58), Max: 98.4 (09-02-22 @ 05:00)  HR: 60 (09-02-22 @ 07:58) (60 - 76)  BP: 137/73 (09-02-22 @ 07:58) (137/73 - 182/67)  RR: 16 (09-02-22 @ 07:58) (16 - 18)  SpO2: 96% (09-02-22 @ 07:58) (93% - 97%)  Wt(kg): --  CAPILLARY BLOOD GLUCOSE          Labs                          12.3   6.26  )-----------( 262      ( 02 Sep 2022 06:10 )             37.6       09-02    139  |  106  |  25<H>  ----------------------------<  93  4.2   |  26  |  0.94    Ca    9.5      02 Sep 2022 06:10  Phos  3.7     09-02  Mg     2.0     09-02    TPro  6.7  /  Alb  3.2<L>  /  TBili  0.4  /  DBili  x   /  AST  13  /  ALT  17  /  AlkPhos  56  09-02          Troponin I, High Sensitivity Result: 8.9 ng/L (09-01-22 @ 20:05)  Troponin I, High Sensitivity Result: 8.2 ng/L (09-01-22 @ 14:58)        Radiology Results      Meds    MEDICATIONS  (STANDING):  amLODIPine   Tablet 5 milliGRAM(s) Oral daily  aspirin enteric coated 81 milliGRAM(s) Oral daily  atorvastatin 40 milliGRAM(s) Oral at bedtime  hydrochlorothiazide 25 milliGRAM(s) Oral daily  influenza  Vaccine (HIGH DOSE) 0.7 milliLiter(s) IntraMuscular once  metoprolol tartrate 25 milliGRAM(s) Oral two times a day  pantoprazole    Tablet 40 milliGRAM(s) Oral before breakfast  valsartan 320 milliGRAM(s) Oral daily      MEDICATIONS  (PRN):  acetaminophen     Tablet .. 650 milliGRAM(s) Oral every 6 hours PRN Temp greater or equal to 38C (100.4F), Mild Pain (1 - 3), Moderate Pain (4 - 6)  aluminum hydroxide/magnesium hydroxide/simethicone Suspension 30 milliLiter(s) Oral every 6 hours PRN Dyspepsia      Physical Exam    Neuro :  no focal deficits  Respiratory: CTA B/L  CV: RRR, S1S2, no murmurs,   Abdominal: Soft, NT, ND +BS,  Extremities: No edema, + peripheral pulses      ASSESSMENT    chest pain,    r/o acs,   pe r/o,   pos acute gastritis,   esophagitis,   r/o pud,   pancreatic cyst,   covid 19 positive,   h/o HTN,   HLD,   GERD,   small hiatal hernia      PLAN    cont tele,   acs protocol,    trop x 2 neg noted above   cardio cons   ct with coronary artherosclerosis   cont lopressor, aspirin, statin  hold rendesivir and decadron since O2 > 94 on ra and pt afebrile,   add vit c, vit d, zinc,   pulm cons   dvt prophylaxis  airborne and contact isolation  gi cons   cont protonix   maalox prn   cont current meds

## 2022-09-02 NOTE — PHYSICAL THERAPY INITIAL EVALUATION ADULT - CRITERIA FOR SKILLED THERAPEUTIC INTERVENTIONS
home PT/impairments found/functional limitations in following categories/risk reduction/prevention/anticipated discharge recommendation

## 2022-09-02 NOTE — DISCHARGE NOTE NURSING/CASE MANAGEMENT/SOCIAL WORK - NSDCPEFALRISK_GEN_ALL_CORE
For information on Fall & Injury Prevention, visit: [email or web address] OR  [email or web address] OR  https://www.cdc.gov/steadi/patient.html [Alert] : alert [No Acute Distress] : no acute distress [Well Nourished] : well nourished [Well Developed] : well developed [Normal Sclera/Conjunctiva] : normal sclera/conjunctiva [EOMI] : extra ocular movement intact [No Proptosis] : no proptosis [No Lid Lag] : no lid lag [Normal Oropharynx] : the oropharynx was normal [No Neck Mass] : no neck mass was observed [No LAD] : no lymphadenopathy [Thyroid Not Enlarged] : the thyroid was not enlarged [No Thyroid Nodules] : there were no palpable thyroid nodules [No Respiratory Distress] : no respiratory distress [Normal Rate and Effort] : normal respiratory rhythm and effort [No Accessory Muscle Use] : no accessory muscle use [Clear to Auscultation] : lungs were clear to auscultation bilaterally [Normal Rate] : heart rate was normal  [Normal S1, S2] : normal S1 and S2 [Regular Rhythm] : with a regular rhythm [Murmurs] : no murmurs [No Rubs] : no pericardial rub [No Edema] : there was no peripheral edema [Not Tender] : non-tender [Soft] : abdomen soft [Not Distended] : not distended [Post Cervical Nodes] : posterior cervical nodes [Anterior Cervical Nodes] : anterior cervical nodes [Supraclavicular Nodes] : supraclavicular nodes [Normal] : normal and non tender [Spine Straight] : spine straight [No Stigmata of Cushings Syndrome] : no stigmata of cushings syndrome [Normal Gait] : normal gait [Normal Strength/Tone] : muscle strength and tone were normal [No Involuntary Movements] : no involuntary movements were seen [No Rash] : no rash [No Skin Lesions] : no skin lesions [No Tremors] : no tremors [Oriented x3] : oriented to person, place, and time [Normal Insight/Judgement] : insight and judgment were intact [Normal Affect] : the affect was normal [Normal Mood] : the mood was normal [Foot Ulcers] : no foot ulcers [Acne] : no acne [Acanthosis Nigricans] : no acanthosis nigricans [Hirsutism] : no hirsutism

## 2022-09-02 NOTE — DISCHARGE NOTE NURSING/CASE MANAGEMENT/SOCIAL WORK - PATIENT PORTAL LINK FT
You can access the FollowMyHealth Patient Portal offered by [facility] by registering at the following website: [email or web address]. By joining [facility]’s FollowMyHealth portal, you will also be able to view your health information using other applications (apps) compatible with our system. Stroke

## 2022-09-02 NOTE — CONSULT NOTE ADULT - PROBLEM SELECTOR RECOMMENDATION 9
bronchodilators prn   oxygen supp prn   monitor oxygen sat levels  PFT as OP Isolation precautions  follow up Covid PCR  Check LDH, LFTs, D-Dimer CRP, Ferritin and Procalcitonin  bronchodilators prn  Vit C, D and Zinc supp   oxygen supp prn   monitor oxygen sat levels  PFT as OP

## 2022-09-02 NOTE — DISCHARGE NOTE PROVIDER - NSDCCPCAREPLAN_GEN_ALL_CORE_FT
PRINCIPAL DISCHARGE DIAGNOSIS  Diagnosis: Chest discomfort  Assessment and Plan of Treatment: You came complaining of chest pain. You were diagnosed with Angina which is a condition marked by severe pain in the chest, often also spreading to the shoulders, arms, jaw, neck, caused by inadequate blood supply to the heart. Your cardiac enzymes were normal, your EKG showed no acute changes, you had a CT of the chest and it showed some cholesterol build up in your heart arteries.    FOLLOW UP WITH YOUR CARDIOLOGY APPOINTMENT AT [facility] WITH DR BRITTNEY JOSHI AT 3PM ON 9/7/2022 -10 [address] [place] [address].      SECONDARY DISCHARGE DIAGNOSES  Diagnosis: 2019 novel coronavirus disease (COVID-19)  Assessment and Plan of Treatment: You were postive for covid on 9/1/2022. You currently have no symptoms therefore follow up with your primary care provider.    Diagnosis: Lesion of pancreas  Assessment and Plan of Treatment:     Diagnosis: HTN (hypertension)  Assessment and Plan of Treatment:     Diagnosis: HLD (hyperlipidemia)  Assessment and Plan of Treatment:     Diagnosis: GERD (gastroesophageal reflux disease)  Assessment and Plan of Treatment:      PRINCIPAL DISCHARGE DIAGNOSIS  Diagnosis: Angina pectoris  Assessment and Plan of Treatment: You came complaining of chest pain. You were diagnosed with Angina which is a condition marked by severe pain in the chest, often also spreading to the shoulders, arms, jaw, neck, caused by inadequate blood supply to the heart. Your cardiac enzymes were normal, your EKG showed no acute changes, you had a CT of the chest and it showed some calcium build up in your heart arteries.  YOU WERE STARTED ON ASPIRIN 81MG DAILY AND ATORVASTATIN 40MG DAILY.    FOLLOW UP WITH YOUR CARDIOLOGY APPOINTMENT AT [facility] WITH DR BRITTNEY JOSHI AT 3PM ON 9/7/2022 -10 [address] [place], [place] [id number].      SECONDARY DISCHARGE DIAGNOSES  Diagnosis: 2019 novel coronavirus disease (COVID-19)  Assessment and Plan of Treatment: You were postive for covid on 9/1/2022. You currently have no symptoms therefore follow up with your primary care provider.    Diagnosis: Lesion of pancreas  Assessment and Plan of Treatment: CT showed a small pancreatic cyst measuring 0.8 cm.   FOLLOW UP WITH A GASTROENTEROLOGIST OR PRIMARY CARE PROVIDER WITHIN 1 WEEK.    Diagnosis: HTN (hypertension)  Assessment and Plan of Treatment: You have a history of Hypertension.   On this admission, your Blood Pressure was adequately controlled with amlodipine, hydrochlorthiazide, metoprolol, and valsartan  Your blood pressure target is 120-140/80-90, maintain healthy lifestyle, low salt diet, avoid fatty food, weight loss, exercise regularly or stay active as tolerated 30 mins X 3 times per week.  Notify your doctor if you have any of the following symptoms:   (Dizziness, Lightheadedness, Blurry vision, Headache, Chest pain, Shortness of breath.)  Please TAKE AMLODIPINE 5MG DAILY, HYDROCHLORTHIAZIDE 25mg DAILY, METOPROLOL 50MG DAILY AND VALSARTAN 320MG DAILY. STOP TAKING CHLORTHALIDONE and follow-up with your PCP in 1 week from discharge to adjust medications as needed.      Diagnosis: HLD (hyperlipidemia)  Assessment and Plan of Treatment: You have history of Hyperlipidemia. On this admission you were found to have abnormal high lipid profile.  Please take ATORVASTATIN 40MG DAILY. Maintain healthy lifestyle, low fat diet, exercise regularly and check your lipid levels routinely.   Please follow up with your PCP in 1 week from discharge.      Diagnosis: GERD (gastroesophageal reflux disease)  Assessment and Plan of Treatment: You have history of GERD- gastroesophageal reflux which is a chronic disease that occurs when stomach acid or bile flows into the food pipe and irritates the lining. START TAKING  PANTOPRAZOLE 40MG DAILY BEFORE BREAKFAST AND MAALOX. Please continue to take your HOME medications and follow up with your PCP / Gastroenterologist in a week from discharge.       PRINCIPAL DISCHARGE DIAGNOSIS  Diagnosis: Atypical chest pain  Assessment and Plan of Treatment: You came complaining of chest pain. Your cardiac enzymes were normal, your EKG showed no acute changes, you had a CT of the chest and it showed some calcium build up in your heart arteries. You were monitored in telemetry unit to monitor for any abnormal heart beat. You were asymptomatic and no acute changes on telemetry or blood work was noted. YOU WERE STARTED ON ASPIRIN 81MG DAILY AND ATORVASTATIN 40MG AT BEDTIME.   PLEASE FOLLOW UP WITH YOUR CARDIOLOGY APPOINTMENT AT [facility] WITH DR BRITTNEY JOSHI AT 3PM ON 9/7/2022 LOCATED AT [address] [place] [address].      SECONDARY DISCHARGE DIAGNOSES  Diagnosis: 2019 novel coronavirus disease (COVID-19)  Assessment and Plan of Treatment: You tested positive for covid19 on 9/1/2022. You are currently asymptomatic and no acute interventions were needed. Please re-consult to the ED or Urgent care if you feel short of breath, oxygen saturation drops below 92%, or persistent fever.   Please follow up with your PCP in a week from       Diagnosis: Lesion of pancreas  Assessment and Plan of Treatment: CT abdomen and pelvis showed a small pancreatic cyst measuring 0.8 cm.   PLEASE FOLLOW UP WITH A GASTROENTEROLOGIST OR PRIMARY CARE PROVIDER WITHIN 1 WEEK FOR FURTHER INSTRUCTIONS.    Diagnosis: HTN (hypertension)  Assessment and Plan of Treatment: You have a history of Hypertension.   On this admission, your Blood Pressure was adequately controlled with amlodipine, hydrochlorthiazide, metoprolol, and valsartan.  Your blood pressure target is 120-140/80-90, maintain healthy lifestyle, low salt diet, avoid fatty food, weight loss, stay active as tolerated 30 mins X 3 times per week.  Notify your doctor if you have any of the following symptoms:   (Dizziness, Lightheadedness, Blurry vision, Headache, Chest pain, Shortness of breath.)  Please TAKE AMLODIPINE 5MG DAILY, HYDROCHLORTHIAZIDE 25mg DAILY, METOPROLOL 50MG DAILY AND VALSARTAN 320 MG DAILY. STOP TAKING CHLORTHALIDONE and follow-up with your PCP in 1 week from discharge to adjust medications as needed.      Diagnosis: HLD (hyperlipidemia)  Assessment and Plan of Treatment: You have history of Hyperlipidemia. On this admission you were found to have abnormal high lipid profile.  Please take ATORVASTATIN 40MG AT BEDTIME, maintain healthy lifestyle, low fat diet, exercise regularly and check your lipid levels routinely.   Please follow up with your PCP in 1 week from discharge.      Diagnosis: GERD (gastroesophageal reflux disease)  Assessment and Plan of Treatment: You have history of GERD- gastroesophageal reflux which is a chronic disease that occurs when stomach acid or bile flows into the food pipe and irritates the lining. START TAKING  PANTOPRAZOLE 40MG DAILY BEFORE BREAKFAST AND MAALOX. Please continue to take your HOME medications and follow up with your PCP / Gastroenterologist in a week from discharge.

## 2022-09-02 NOTE — DISCHARGE NOTE PROVIDER - CARE PROVIDER_API CALL
Gold Causey  INTERNAL MEDICINE  [phone] [place] [address]  Phone: [phone]  Fax: [phone]  Follow Up Time: 2 weeks

## 2022-09-02 NOTE — PHYSICAL THERAPY INITIAL EVALUATION ADULT - RANGE OF MOTION EXAMINATION, REHAB EVAL
except for both sh flexion limited to ~ 90 deg-chronic per patient/bilateral upper extremity ROM was WFL (within functional limits)/bilateral lower extremity ROM was WFL (within functional limits)

## 2022-09-02 NOTE — PHYSICAL THERAPY INITIAL EVALUATION ADULT - PERTINENT HX OF CURRENT PROBLEM, REHAB EVAL
Pt admitted with c/o palpitations and a sensation of chest burning while lying down incidentally found to have coronary calcifications on CT chest, (+) COVID

## 2022-09-02 NOTE — DISCHARGE NOTE PROVIDER - NSDCMRMEDTOKEN_GEN_ALL_CORE_FT
amLODIPine 5 mg oral tablet: 1 tab(s) orally once a day  aspirin 81 mg oral delayed release tablet: 1 tab(s) orally once a day  chlorthalidone 25 mg oral tablet: 1 tab(s) orally once a day  hydroCHLOROthiazide 25 mg oral tablet: 1 tab(s) orally once a day  losartan 100 mg oral tablet: 1 tab(s) orally once a day  Metoprolol Succinate ER 50 mg oral tablet, extended release: 1 tab(s) orally once a day  valsartan 320 mg oral tablet: 1 tab(s) orally once a day   aluminum hydroxide-magnesium hydroxide 200 mg-200 mg/5 mL oral suspension: 30 milliliter(s) orally every 6 hours, As needed, Dyspepsia  amLODIPine 5 mg oral tablet: 1 tab(s) orally once a day  aspirin 81 mg oral delayed release tablet: 1 tab(s) orally once a day  atorvastatin 40 mg oral tablet: 1 tab(s) orally once a day (at bedtime)  hydroCHLOROthiazide 25 mg oral tablet: 1 tab(s) orally once a day  Metoprolol Succinate ER 50 mg oral tablet, extended release: 1 tab(s) orally once a day  pantoprazole 40 mg oral delayed release tablet: 1 tab(s) orally once a day (before a meal)  valsartan 320 mg oral tablet: 1 tab(s) orally once a day

## 2022-09-02 NOTE — CONSULT NOTE ADULT - SUBJECTIVE AND OBJECTIVE BOX
C A R D I O L O G Y  *********************    DATE OF SERVICE: 09-02-22    HISTORY OF PRESENT ILLNESS: HPI:  75 y/o female, from home, ambu w/ out assist w/ pmhx of HTN, HLD, GERD, small hiatal hernia, p/w chest pain. The patient had interscapular pain which started on 8/31/22 at 11 PM. She woke up this morning (9/1/22) with burning chest pain, diaphoresis, and palpitations. Pain is aggravated by lying down. It is not aggravated by exertion. Pain is non radiating. No alleviating factors present. Headache associated with the pain. Pt has never experienced such symptoms before. Pt denies any chills, fevers, nausea, vomiting, SOB, dizziness, weakness, diarrhea, constipation dysuria, or numbness/tingling.  (01 Sep 2022 19:53)    Pt is a 76 year old female with a pmh of HTN and HLD who presents with a palpitations and a sensation of chest fatigue, incidentally found to have coronary calcifications on CT chest. Cardiology consulted for evaluation.    Patient is well known to us from our clinic, she follows with Dr. Crain. She states yesterday she felt like her heart was racing and states she felt tired. Found to have COVID incidentally when she was admitted. She denies any shortness of breath, dizziness/lightheadedness, orthopnea, PND or LE edema. She states she is able to walk on level ground and up stairs without chest pain but states she does feel fatigued. She currently denies any chest pain to me and states she did not feel any     The patient is normotensive with no evidence of heart failure on examination.  I have assured her that calcification of the aortic knob which is found incidentally is not anything to be worried about at  this time, although she does need to be on a statin. I restarted her on pravastatin 20 mg p.o. q.h.s. and I have asked  her to take it at nighttime so it does not upset her stomach after dinner.  She will continue her current medical regimen including hydralazine 25 mg t.i.d., metoprolol succinate 25 mg daily,  amlodipine 5 mg daily, chlorthalidone 25 mg daily, and valsartan 320 mg q.d.  Her recent echocardiogram in June showed normal left ventricular size and function with minimal mitral regurgitation.  She does have mild peripheral arterial disease, for which, she will follow up with Dr. Crain.  I have scheduled her for an exercise nuclear stress test in the office to make sure there is no evidence of stress-induced  ischemia or infarction with evidence of calcification.   Mena Welch  Page 2 / 3        The patient comes in today with her daughter. She states she recently had a CAT scan of her shoulder which did show  a mild calcification at the level of the aortic knob and she is worried about that and comes in today for a followup visit.  She admits that she feels relatively well but does complain of occasional dyspnea on exertion. She has stopped taking  her statin as it was upsetting her stomach.  She denies chest pains, pleuritic chest pain, lightheadedness, or syncope.  PMHX: Hypertension; Hypercholesteremia  PSHX:  Social HX: Alcohol - SOCIAL DRINKER; Drugs - NO HISTORY OF DRUG ABUSE; Smoking - NEVER  SMOKED  Fam HX: No Pertinent Family History  Allergies: No Known Drug Allergies  Medications: amlodipine 5 mg tablet 1 TABLET DAILY  chlorthalidone 25 mg tablet 1 TABLET DAILY  hydralazine 25 mg tablet 1 TABLET TID  metoprolol succinate ER 50 mg tablet,extended release 24 hr 1 TABLET DAILY  valsartan 320 mg tablet 1 TABLET DAILY              MEDICATIONS:  MEDICATIONS  (STANDING):  amLODIPine   Tablet 5 milliGRAM(s) Oral daily  aspirin enteric coated 81 milliGRAM(s) Oral daily  atorvastatin 40 milliGRAM(s) Oral at bedtime  hydrochlorothiazide 25 milliGRAM(s) Oral daily  influenza  Vaccine (HIGH DOSE) 0.7 milliLiter(s) IntraMuscular once  metoprolol tartrate 25 milliGRAM(s) Oral two times a day  pantoprazole    Tablet 40 milliGRAM(s) Oral before breakfast  valsartan 320 milliGRAM(s) Oral daily          REVIEW OF SYSTEMS:  [ ]chest pain  [  ]shortness of breath  [  ]palpitations  [  ]syncope  [ ]near syncope [ ]upper extremity weakness   [ ] lower extremity weakness  [  ]diplopia  [  ]altered mental status   [  ]fevers  [ ]chills [ ]nausea  [ ]vomiting  [  ]dysphagia    [ ]abdominal pain  [ ]melena  [ ]BRBPR    [  ]epistaxis  [  ]rash    [ ]lower extremity edema        [X] All others negative	  [ ] Unable to obtain      LABS:	 	    CARDIAC MARKERS:                              12.3   6.26  )-----------( 262      ( 02 Sep 2022 06:10 )             37.6     Hb Trend: 12.3<--, 13.1<--    09-02    139  |  106  |  25<H>  ----------------------------<  93  4.2   |  26  |  0.94    Ca    9.5      02 Sep 2022 06:10  Phos  3.7     09-02  Mg     2.0     09-02    TPro  6.7  /  Alb  3.2<L>  /  TBili  0.4  /  DBili  x   /  AST  13  /  ALT  17  /  AlkPhos  56  09-02    Creatinine Trend: 0.94<--, 0.91<--    PHYSICAL EXAM:  T(C): 36.7 (09-02-22 @ 07:58), Max: 36.9 (09-02-22 @ 05:00)  HR: 72 (09-02-22 @ 10:34) (60 - 76)  BP: 143/62 (09-02-22 @ 10:34) (137/73 - 182/67)  RR: 16 (09-02-22 @ 07:58) (16 - 18)  SpO2: 96% (09-02-22 @ 10:34) (93% - 97%)  Wt(kg): --   BMI (kg/m2): 26.2 (09-01-22 @ 11:45)  I&O's Summary      General: Well nourished in no acute distress. Alert and Oriented * 3.   Head: Normocephalic and atraumatic.   Neck: No JVD. No bruits. Supple. Does not appear to be enlarged.   Cardiovascular: + S1,S2 ; RRR Soft systolic murmur at the left lower sternal border. No rubs noted.    Lungs: CTA b/l. No rhonchi, rales or wheezes.   Abdomen: + BS, soft. Non tender. Non distended. No rebound. No guarding.   Extremities: No clubbing/cyanosis/edema.   Neurologic: Moves all four extremities. Full range of motion.   Skin: Warm and moist. The patient's skin has normal elasticity and good skin turgor.   Psychiatric: Appropriate mood and affect.  Musculoskeletal: Normal range of motion, normal strength       TELEMETRY: 	      ECG:  	    RADIOLOGY:         CXR:     ASSESSMENT/PLAN: 	77y Female     C A R D I O L O G Y  *********************    DATE OF SERVICE: 09-02-22    HISTORY OF PRESENT ILLNESS: HPI:    Pt is a 76 year old female with a pmh of HTN and HLD who presents with a palpitations and a sensation of chest burning while lying down incidentally found to have coronary calcifications on CT chest. Cardiology consulted for evaluation.    Patient is well known to us from our clinic, she follows with Dr. Crain. She states yesterday she felt like her heart was racing and states she felt tired. Also described some chest burning while she was lying down. Found to have COVID incidentally when she was admitted. She denies any shortness of breath, dizziness/lightheadedness, orthopnea, PND or LE edema. She states she is able to walk on level ground and up stairs without chest pain but states she does feel fatigued. She currently denies any chest pain.  ECHO cardiogram performed in our clinic in June 2022 showed normal EF. Shewas scheduled for a stress test in our clinic at her next visit but has not gotten around to having this test done yet.    PMHX: Hypertension; Hypercholesteremia, Small hiatal hernia, Mild PVD  PSHX: None  Social HX:   Alcohol - SOCIAL DRINKER;   Drugs - NO HISTORY OF DRUG ABUSE;   Smoking - NEVER SMOKED  Fam HX: No Pertinent Family History  Allergies: No Known Drug Allergies    Home Medications:   amlodipine 5 mg tablet 1 TABLET DAILY  chlorthalidone 25 mg tablet 1 TABLET DAILY  hydralazine 25 mg tablet 1 TABLET TID  metoprolol succinate ER 50 mg tablet,extended release 24 hr 1 TABLET DAILY  valsartan 320 mg tablet 1 TABLET DAILY    Hospital MEDICATIONS:  MEDICATIONS  (STANDING):  amLODIPine   Tablet 5 milliGRAM(s) Oral daily  aspirin enteric coated 81 milliGRAM(s) Oral daily  atorvastatin 40 milliGRAM(s) Oral at bedtime  hydrochlorothiazide 25 milliGRAM(s) Oral daily  influenza  Vaccine (HIGH DOSE) 0.7 milliLiter(s) IntraMuscular once  metoprolol tartrate 25 milliGRAM(s) Oral two times a day  pantoprazole    Tablet 40 milliGRAM(s) Oral before breakfast  valsartan 320 milliGRAM(s) Oral daily      REVIEW OF SYSTEMS:  [ ]chest pain  [  ]shortness of breath  [ X ]palpitations  [  ]syncope  [ ]near syncope [ ]upper extremity weakness   [ ] lower extremity weakness  [  ]diplopia  [  ]altered mental status   [  ]fevers  [ ]chills [ ]nausea  [ ]vomiting  [  ]dysphagia    [ ]abdominal pain  [ ]melena  [ ]BRBPR    [  ]epistaxis  [  ]rash    [ ]lower extremity edema    [X] All others negative	  [ ] Unable to obtain      LABS:	 	    CARDIAC MARKERS:                 12.3   6.26  )-----------( 262      ( 02 Sep 2022 06:10 )             37.6     Hb Trend: 12.3<--, 13.1<--    09-02    139  |  106  |  25<H>  ----------------------------<  93  4.2   |  26  |  0.94    Ca    9.5      02 Sep 2022 06:10  Phos  3.7     09-02  Mg     2.0     09-02    TPro  6.7  /  Alb  3.2<L>  /  TBili  0.4  /  DBili  x   /  AST  13  /  ALT  17  /  AlkPhos  56  09-02    Creatinine Trend: 0.94<--, 0.91<--    PHYSICAL EXAM:  T(C): 36.7 (09-02-22 @ 07:58), Max: 36.9 (09-02-22 @ 05:00)  HR: 72 (09-02-22 @ 10:34) (60 - 76)  BP: 143/62 (09-02-22 @ 10:34) (137/73 - 182/67)  RR: 16 (09-02-22 @ 07:58) (16 - 18)  SpO2: 96% (09-02-22 @ 10:34) (93% - 97%)  Wt(kg): --   BMI (kg/m2): 26.2 (09-01-22 @ 11:45)  I&O's Summary      General: Well nourished in no acute distress. Alert and Oriented * 3.   Head: Normocephalic and atraumatic.   Neck: No JVD. No bruits. Supple. Does not appear to be enlarged.   Cardiovascular: + S1,S2 ; RRR Soft systolic murmur at the left lower sternal border. No rubs noted.    Lungs: CTA b/l. No rhonchi, rales or wheezes.   Abdomen: + BS, soft. Non tender. Non distended. No rebound. No guarding.   Extremities: No clubbing/cyanosis/edema.   Neurologic: Moves all four extremities. Full range of motion.   Skin: Warm and moist. The patient's skin has normal elasticity and good skin turgor.   Psychiatric: Appropriate mood and affect.  Musculoskeletal: Normal range of motion, normal strength       TELEMETRY: NSR    ECG:  	Sinus Bradycardia    RADIOLOGY:    Chest Xray:  Heart magnified by technique.    Lungs are clear.    Chest is similar to November 18, 2016.    IMPRESSION: No acute finding or change.  CT Angio C/A/P:  RTA: No thoracic aortic intramural hematoma. No aortic dissection. Mild   to moderate calcific and noncalcified plaque throughout the   thoracoabdominal aorta.  MEDIASTINUM: Dilated left atrium. Coronary atherosclerosis. No   pericardial effusion. No large mediastinal lymph nodes.  AIRWAYS, LUNGS, PLEURA: Clear central airways. No consolidation. No   pleural effusion.. Few scattered nonspecific pleural calcifications.  ABDOMEN and PELVIS: 0.8 cm hypodense lesion within the pancreatic body   (image 130, series 17). Hysterectomy. The abdominal and pelvic organs are   otherwise unremarkable. Calcified splenic artery aneurysm measures 1 cm.  No bowel obstruction. Normal appendix. Colonic diverticulosis. No free   fluid. No abdominal or pelvic lymphadenopathy.  BONES: Unremarkable.  SOFT TISSUES: Unremarkable.    Carotid Duplex: 1.Mild stenosis at the right internal carotid artery.    LE Duplex:  Conclusions:  1.Right and left proximal inflow disease correlating with a 1-19% stenosis.  2.Right and left run off disease at the dorsalis pedis arteries correlating with a 20-49%  stenosis.    TTE:  1. Normal left ventricular size and function.  Mild diastolic dysfunction.  2. Normal left atrial size  3. Right atrial cavity is normal in size.  4. Normal right ventricular size and function.  5. Normal trileaflet aortic valve opening.  6. Normal mitral valve opening.  7. Normal appearing tricuspid valve with mild tricuspid  regurgitation.  8. Pulmonic valve is grossly normal, yet poorly visualized  with no doppler evidence for pulmonic stenosis.  9. No evidence of significant pericardial effusion.  10. The aortic root is normal.  11. Normal pulmonary artery.  12. IVC is normal with respiratory variation.    Stress 6/2019:  Normal maximal exercise treadmill stress test.  Good exercise performance.  Normal myocardial perfusion SPECT images.  Normal left ventricular size and function. Calculated EF is 75%      ASSESSMENT/PLAN: Pt is a 76 year old female with a pmh of HTN and HLD who presents with a palpitations and a sensation of chest burning while lying down incidentally found to have coronary calcifications on CT chest and COVID. Cardiology consulted for evaluation.    - EKG non-ischemic and troponins negative  - previous negative stress performed in 2019  - atypical symptoms insetting of COVID infection. CT chest noted for coronary calcifications, However her symptoms are non-exertional and atypical (chest burning)  - recomend checking an echocardiogram, if no wall motion abnormlaities seen can be discharged and follow with us in clinic for a stress test.  - c/w AMlodipine, Metoprolol, HCTZ, Valsartan and Statin    Natalie Angeles MD  Pager: [phone]

## 2022-09-02 NOTE — PHYSICAL THERAPY INITIAL EVALUATION ADULT - GAIT DEVIATIONS NOTED, PT EVAL
decreased michelle/increased time in double stance/decreased velocity of limb motion/decreased step length

## 2022-09-02 NOTE — DISCHARGE NOTE PROVIDER - HOSPITAL COURSE
77 y/o female, from home, ambu w/ out assist w/ pmhx of HTN, HLD, GERD, small hiatal hernia, p/w chest pain and admitted froe ACS rule out.     Atypical Chest pain.   CT chest/ aorta showed No aortic dissection, CT angio showed Coronary atherosclerosis. EKG and troponin were within normal limits. Follow up Echocardiogram and stress test as outpatient on 9/7/22 at 3pm with Paras Alamo.     HLD   Not on any medications at home. start atorvastatin 40 mg. LDL: 103      HTN   Pt takes metoprolol succinate, amlodipine, chlorthalidone, valsartan, hydrochlorothiazide. Continue on metop tart 25 BID, amlod 5 qd, hctzd 25 qd, valsartan 325 qd      GERD   Patient had a H/o small hiatal hernia, experiences heart burn on and off at home, not on any medication currently.  CT of abdomen shows a small pancreatic cyst of 0.8 cm. Start taking Protonix 40mg and maalox     2019 novel coronavirus disease (COVID-19)  Covid positive on (9/1) currently asymptomatic, no indication for medication     77 y/o female, from home, ambu w/ out assist w/ pmhx of HTN, HLD, GERD, small hiatal hernia, p/w chest pain and admitted froe ACS rule out.     Atypical Chest pain.   CT chest/ aorta showed No aortic dissection, CT angio showed Coronary atherosclerosis. EKG and troponin were within normal limits. Follow up Echocardiogram and stress test as outpatient on 9/7/22 at 3pm with Paras lAamo.     HLD   Not on any medications at home. start atorvastatin 40 mg. LDL: 103      HTN   Pt takes metoprolol succinate, amlodipine, chlorthalidone, valsartan, hydrochlorothiazide. Continue on metop tart 25 BID, amlod 5 qd, hctzd 25 qd, valsartan 325 qd    Pancreatic Cyst   CT of abdomen shows a small pancreatic cyst of 0.8cm   follow up with outpatient gastroenterologist and primary care provider for further assessment.     GERD   Patient had a H/o small hiatal hernia, experiences heart burn on and off at home, not on any medication currently.  CT of abdomen shows a small pancreatic cyst of 0.8 cm. Start taking Protonix 40mg and maalox     2019 novel coronavirus disease (COVID-19)  Covid positive on (9/1) currently asymptomatic, no indication for medication     77 y/o female, from home, ambu w/ out assist w/ pmhx of HTN, HLD, GERD, small hiatal hernia, p/w chest pain and admitted froe ACS rule out.     Atypical Chest pain.   CT chest/ aorta showed No aortic dissection, CT angio showed Coronary atherosclerosis. EKG and troponin were within normal limits. Follow up Echocardiogram and stress test as outpatient on 9/7/22 at 3pm with Paras Alamo.     HLD   Not on any medications at home. start atorvastatin 40 mg. LDL: 103     HTN   Pt takes metoprolol succinate, amlodipine, chlorthalidone, valsartan, hydrochlorothiazide. Continue on metop tart 25 BID, amlod 5 qd, hctzd 25 qd, valsartan 325 qd    Pancreatic Cyst   CT of abdomen shows a small pancreatic cyst of 0.8cm   follow up with outpatient gastroenterologist and primary care provider for further assessment.     GERD   Patient had a H/o small hiatal hernia, experiences heart burn on and off at home, not on any medication currently.  CT of abdomen shows a small pancreatic cyst of 0.8 cm. Start taking Protonix 40mg and maalox    2019 novel coronavirus disease (COVID-19)  Covid positive on (9/1) currently asymptomatic, no indication for medication    Patient is stable for discharge and is advised to follow up with PCP as outpatient.  Please refer to patient's complete medical chart with documents for a full hospital course, for this is only a brief summary.         76 y/o female, from home, ambu w/ out assist w/ pmhx of HTN, HLD, GERD, small hiatal hernia, p/w chest pain and admitted froe ACS rule out.     Atypical Chest pain.   CT chest/ aorta showed No aortic dissection, CT angio showed Coronary atherosclerosis. EKG and troponin were within normal limits. Follow up Echocardiogram and stress test as outpatient on 9/7/22 at 3pm with Paras Alamo.     HLD   Not on any medications at home. start atorvastatin 40 mg. LDL: 103     HTN   Pt takes metoprolol succinate, amlodipine, chlorthalidone, valsartan, hydrochlorothiazide. Continue on metop tart 25 BID, amlod 5 qd, hctzd 25 qd, valsartan 325 qd    Pancreatic Cyst   CT of abdomen shows a small pancreatic cyst of 0.8cm   follow up with outpatient gastroenterologist and primary care provider for further assessment.     GERD   Patient had a H/o small hiatal hernia, experiences heart burn on and off at home, not on any medication currently.  CT of abdomen shows a small pancreatic cyst of 0.8 cm. Start taking Protonix 40mg and maalox    2019 novel coronavirus disease (COVID-19)  Covid positive on (9/1) currently asymptomatic, no indication for medication    Patient is stable for discharge and is advised to follow up with PCP as outpatient.  Please refer to patient's complete medical chart with documents for a full hospital course, for this is only a brief summary.

## 2022-09-02 NOTE — CONSULT NOTE ADULT - PROBLEM SELECTOR RECOMMENDATION 2
ACS protocol   monitor vitals  check cardiac markers  cardio follow-up ACS protocol   monitor vitals  check cardiac markers  cardio eval  Echo

## 2022-09-24 ENCOUNTER — INPATIENT (INPATIENT)
Facility: HOSPITAL | Age: 77
LOS: 1 days | Discharge: HOME | DRG: 313 | End: 2022-09-26
Attending: INTERNAL MEDICINE | Admitting: INTERNAL MEDICINE
Payer: COMMERCIAL

## 2022-09-24 VITALS
SYSTOLIC BLOOD PRESSURE: 194 MMHG | HEART RATE: 85 BPM | HEIGHT: 63 IN | WEIGHT: 143.08 LBS | DIASTOLIC BLOOD PRESSURE: 81 MMHG | TEMPERATURE: 98 F | OXYGEN SATURATION: 96 % | RESPIRATION RATE: 18 BRPM

## 2022-09-24 PROCEDURE — 99285 EMERGENCY DEPT VISIT HI MDM: CPT | Mod: 25

## 2022-09-24 PROCEDURE — 93010 ELECTROCARDIOGRAM REPORT: CPT

## 2022-09-24 NOTE — ED CLERICAL - NS ED CLERK NOTE PRE-ARRIVAL INFORMATION; ADDITIONAL PRE-ARRIVAL INFORMATION
This patient is eligible for (or currently enrolled in) an outpatient care management program available through [employer]. This program can coordinate outpatient follow up and assist the patient in accessing a variety of outpatient resources.  If discharged from the ED, the patient will be contacted to see if any additional resources are needed.                                                                                    Please call the Nurse Clinical Call Center at [phone] with any questions or for assistance in discharge planning.

## 2022-09-25 DIAGNOSIS — R07.9 CHEST PAIN, UNSPECIFIED: ICD-10-CM

## 2022-09-25 DIAGNOSIS — M79.89 OTHER SPECIFIED SOFT TISSUE DISORDERS: ICD-10-CM

## 2022-09-25 DIAGNOSIS — R10.9 UNSPECIFIED ABDOMINAL PAIN: ICD-10-CM

## 2022-09-25 DIAGNOSIS — K86.2 CYST OF PANCREAS: ICD-10-CM

## 2022-09-25 LAB
A1C WITH ESTIMATED AVERAGE GLUCOSE RESULT: 5.4 % — SIGNIFICANT CHANGE UP (ref 4–5.6)
ALBUMIN SERPL ELPH-MCNC: 4.2 G/DL — SIGNIFICANT CHANGE UP (ref 3.3–5)
ALBUMIN SERPL ELPH-MCNC: 4.6 G/DL — SIGNIFICANT CHANGE UP (ref 3.3–5)
ALP SERPL-CCNC: 67 U/L — SIGNIFICANT CHANGE UP (ref 40–120)
ALP SERPL-CCNC: 75 U/L — SIGNIFICANT CHANGE UP (ref 40–120)
ALT FLD-CCNC: 15 U/L — SIGNIFICANT CHANGE UP (ref 10–45)
ALT FLD-CCNC: 16 U/L — SIGNIFICANT CHANGE UP (ref 10–45)
ANION GAP SERPL CALC-SCNC: 10 MMOL/L — SIGNIFICANT CHANGE UP (ref 5–17)
ANION GAP SERPL CALC-SCNC: 11 MMOL/L — SIGNIFICANT CHANGE UP (ref 5–17)
ANION GAP SERPL CALC-SCNC: 9 MMOL/L — SIGNIFICANT CHANGE UP (ref 5–17)
APTT BLD: 37.6 SEC — HIGH (ref 27.5–35.5)
AST SERPL-CCNC: 17 U/L — SIGNIFICANT CHANGE UP (ref 10–40)
AST SERPL-CCNC: 29 U/L — SIGNIFICANT CHANGE UP (ref 10–40)
BASOPHILS # BLD AUTO: 0.05 K/UL — SIGNIFICANT CHANGE UP (ref 0–0.2)
BASOPHILS NFR BLD AUTO: 0.5 % — SIGNIFICANT CHANGE UP (ref 0–2)
BILIRUB SERPL-MCNC: 0.2 MG/DL — SIGNIFICANT CHANGE UP (ref 0.2–1.2)
BILIRUB SERPL-MCNC: 0.2 MG/DL — SIGNIFICANT CHANGE UP (ref 0.2–1.2)
BUN SERPL-MCNC: 18 MG/DL — SIGNIFICANT CHANGE UP (ref 7–23)
BUN SERPL-MCNC: 19 MG/DL — SIGNIFICANT CHANGE UP (ref 7–23)
BUN SERPL-MCNC: 20 MG/DL — SIGNIFICANT CHANGE UP (ref 7–23)
CALCIUM SERPL-MCNC: 9.3 MG/DL — SIGNIFICANT CHANGE UP (ref 8.4–10.5)
CALCIUM SERPL-MCNC: 9.3 MG/DL — SIGNIFICANT CHANGE UP (ref 8.4–10.5)
CALCIUM SERPL-MCNC: 9.7 MG/DL — SIGNIFICANT CHANGE UP (ref 8.4–10.5)
CHLORIDE SERPL-SCNC: 105 MMOL/L — SIGNIFICANT CHANGE UP (ref 96–108)
CHLORIDE SERPL-SCNC: 106 MMOL/L — SIGNIFICANT CHANGE UP (ref 96–108)
CHLORIDE SERPL-SCNC: 106 MMOL/L — SIGNIFICANT CHANGE UP (ref 96–108)
CO2 SERPL-SCNC: 25 MMOL/L — SIGNIFICANT CHANGE UP (ref 22–31)
CO2 SERPL-SCNC: 25 MMOL/L — SIGNIFICANT CHANGE UP (ref 22–31)
CO2 SERPL-SCNC: 26 MMOL/L — SIGNIFICANT CHANGE UP (ref 22–31)
CREAT SERPL-MCNC: 0.94 MG/DL — SIGNIFICANT CHANGE UP (ref 0.5–1.3)
CREAT SERPL-MCNC: 1 MG/DL — SIGNIFICANT CHANGE UP (ref 0.5–1.3)
CREAT SERPL-MCNC: 1.08 MG/DL — SIGNIFICANT CHANGE UP (ref 0.5–1.3)
EGFR: 52 ML/MIN/1.73M2 — LOW
EGFR: 57 ML/MIN/1.73M2 — LOW
EGFR: 62 ML/MIN/1.73M2 — SIGNIFICANT CHANGE UP
EOSINOPHIL # BLD AUTO: 0.14 K/UL — SIGNIFICANT CHANGE UP (ref 0–0.5)
EOSINOPHIL NFR BLD AUTO: 1.5 % — SIGNIFICANT CHANGE UP (ref 0–6)
ESTIMATED AVERAGE GLUCOSE: 108 MG/DL — SIGNIFICANT CHANGE UP (ref 68–114)
FLUAV AG NPH QL: SIGNIFICANT CHANGE UP
FLUBV AG NPH QL: SIGNIFICANT CHANGE UP
GLUCOSE SERPL-MCNC: 115 MG/DL — HIGH (ref 70–99)
GLUCOSE SERPL-MCNC: 124 MG/DL — HIGH (ref 70–99)
GLUCOSE SERPL-MCNC: 125 MG/DL — HIGH (ref 70–99)
HCT VFR BLD CALC: 36.1 % — SIGNIFICANT CHANGE UP (ref 34.5–45)
HCT VFR BLD CALC: 39.9 % — SIGNIFICANT CHANGE UP (ref 34.5–45)
HGB BLD-MCNC: 11.7 G/DL — SIGNIFICANT CHANGE UP (ref 11.5–15.5)
HGB BLD-MCNC: 13 G/DL — SIGNIFICANT CHANGE UP (ref 11.5–15.5)
IMM GRANULOCYTES NFR BLD AUTO: 0.4 % — SIGNIFICANT CHANGE UP (ref 0–0.9)
INR BLD: 1.07 RATIO — SIGNIFICANT CHANGE UP (ref 0.88–1.16)
LIDOCAIN IGE QN: 37 U/L — SIGNIFICANT CHANGE UP (ref 7–60)
LYMPHOCYTES # BLD AUTO: 1.22 K/UL — SIGNIFICANT CHANGE UP (ref 1–3.3)
LYMPHOCYTES # BLD AUTO: 12.7 % — LOW (ref 13–44)
MAGNESIUM SERPL-MCNC: 1.6 MG/DL — SIGNIFICANT CHANGE UP (ref 1.6–2.6)
MCHC RBC-ENTMCNC: 30 PG — SIGNIFICANT CHANGE UP (ref 27–34)
MCHC RBC-ENTMCNC: 30.1 PG — SIGNIFICANT CHANGE UP (ref 27–34)
MCHC RBC-ENTMCNC: 32.4 GM/DL — SIGNIFICANT CHANGE UP (ref 32–36)
MCHC RBC-ENTMCNC: 32.6 GM/DL — SIGNIFICANT CHANGE UP (ref 32–36)
MCV RBC AUTO: 91.9 FL — SIGNIFICANT CHANGE UP (ref 80–100)
MCV RBC AUTO: 92.8 FL — SIGNIFICANT CHANGE UP (ref 80–100)
MONOCYTES # BLD AUTO: 0.76 K/UL — SIGNIFICANT CHANGE UP (ref 0–0.9)
MONOCYTES NFR BLD AUTO: 7.9 % — SIGNIFICANT CHANGE UP (ref 2–14)
NEUTROPHILS # BLD AUTO: 7.42 K/UL — HIGH (ref 1.8–7.4)
NEUTROPHILS NFR BLD AUTO: 77 % — SIGNIFICANT CHANGE UP (ref 43–77)
NRBC # BLD: 0 /100 WBCS — SIGNIFICANT CHANGE UP (ref 0–0)
NRBC # BLD: 0 /100 WBCS — SIGNIFICANT CHANGE UP (ref 0–0)
NT-PROBNP SERPL-SCNC: 343 PG/ML — HIGH (ref 0–300)
PLATELET # BLD AUTO: 276 K/UL — SIGNIFICANT CHANGE UP (ref 150–400)
PLATELET # BLD AUTO: 280 K/UL — SIGNIFICANT CHANGE UP (ref 150–400)
POTASSIUM SERPL-MCNC: 4.3 MMOL/L — SIGNIFICANT CHANGE UP (ref 3.5–5.3)
POTASSIUM SERPL-MCNC: 4.9 MMOL/L — SIGNIFICANT CHANGE UP (ref 3.5–5.3)
POTASSIUM SERPL-MCNC: 5.4 MMOL/L — HIGH (ref 3.5–5.3)
POTASSIUM SERPL-SCNC: 4.3 MMOL/L — SIGNIFICANT CHANGE UP (ref 3.5–5.3)
POTASSIUM SERPL-SCNC: 4.9 MMOL/L — SIGNIFICANT CHANGE UP (ref 3.5–5.3)
POTASSIUM SERPL-SCNC: 5.4 MMOL/L — HIGH (ref 3.5–5.3)
PROT SERPL-MCNC: 6.9 G/DL — SIGNIFICANT CHANGE UP (ref 6–8.3)
PROT SERPL-MCNC: 7.8 G/DL — SIGNIFICANT CHANGE UP (ref 6–8.3)
PROTHROM AB SERPL-ACNC: 12.3 SEC — SIGNIFICANT CHANGE UP (ref 10.5–13.4)
RBC # BLD: 3.89 M/UL — SIGNIFICANT CHANGE UP (ref 3.8–5.2)
RBC # BLD: 4.34 M/UL — SIGNIFICANT CHANGE UP (ref 3.8–5.2)
RBC # FLD: 12.9 % — SIGNIFICANT CHANGE UP (ref 10.3–14.5)
RBC # FLD: 13 % — SIGNIFICANT CHANGE UP (ref 10.3–14.5)
RSV RNA NPH QL NAA+NON-PROBE: SIGNIFICANT CHANGE UP
SARS-COV-2 RNA SPEC QL NAA+PROBE: SIGNIFICANT CHANGE UP
SODIUM SERPL-SCNC: 140 MMOL/L — SIGNIFICANT CHANGE UP (ref 135–145)
SODIUM SERPL-SCNC: 141 MMOL/L — SIGNIFICANT CHANGE UP (ref 135–145)
SODIUM SERPL-SCNC: 142 MMOL/L — SIGNIFICANT CHANGE UP (ref 135–145)
TROPONIN T, HIGH SENSITIVITY RESULT: 6 NG/L — SIGNIFICANT CHANGE UP (ref 0–51)
TROPONIN T, HIGH SENSITIVITY RESULT: 7 NG/L — SIGNIFICANT CHANGE UP (ref 0–51)
TSH SERPL-MCNC: 1.41 UIU/ML — SIGNIFICANT CHANGE UP (ref 0.27–4.2)
WBC # BLD: 8.26 K/UL — SIGNIFICANT CHANGE UP (ref 3.8–10.5)
WBC # BLD: 9.63 K/UL — SIGNIFICANT CHANGE UP (ref 3.8–10.5)
WBC # FLD AUTO: 8.26 K/UL — SIGNIFICANT CHANGE UP (ref 3.8–10.5)
WBC # FLD AUTO: 9.63 K/UL — SIGNIFICANT CHANGE UP (ref 3.8–10.5)

## 2022-09-25 PROCEDURE — 78452 HT MUSCLE IMAGE SPECT MULT: CPT | Mod: 26

## 2022-09-25 PROCEDURE — 93018 CV STRESS TEST I&R ONLY: CPT

## 2022-09-25 PROCEDURE — 71045 X-RAY EXAM CHEST 1 VIEW: CPT | Mod: 26

## 2022-09-25 PROCEDURE — 93016 CV STRESS TEST SUPVJ ONLY: CPT

## 2022-09-25 RX ORDER — LIDOCAINE 4 G/100G
5 CREAM TOPICAL ONCE
Refills: 0 | Status: COMPLETED | OUTPATIENT
Start: 2022-09-25 | End: 2022-09-25

## 2022-09-25 RX ORDER — PANTOPRAZOLE SODIUM 20 MG/1
40 TABLET, DELAYED RELEASE ORAL
Refills: 0 | Status: DISCONTINUED | OUTPATIENT
Start: 2022-09-26 | End: 2022-09-26

## 2022-09-25 RX ORDER — HYDRALAZINE HCL 50 MG
25 TABLET ORAL THREE TIMES A DAY
Refills: 0 | Status: DISCONTINUED | OUTPATIENT
Start: 2022-09-25 | End: 2022-09-26

## 2022-09-25 RX ORDER — FAMOTIDINE 10 MG/ML
20 INJECTION INTRAVENOUS ONCE
Refills: 0 | Status: COMPLETED | OUTPATIENT
Start: 2022-09-25 | End: 2022-09-25

## 2022-09-25 RX ORDER — ASPIRIN/CALCIUM CARB/MAGNESIUM 324 MG
81 TABLET ORAL DAILY
Refills: 0 | Status: DISCONTINUED | OUTPATIENT
Start: 2022-09-25 | End: 2022-09-26

## 2022-09-25 RX ORDER — VALSARTAN 80 MG/1
320 TABLET ORAL DAILY
Refills: 0 | Status: DISCONTINUED | OUTPATIENT
Start: 2022-09-25 | End: 2022-09-26

## 2022-09-25 RX ORDER — PANTOPRAZOLE SODIUM 20 MG/1
40 TABLET, DELAYED RELEASE ORAL ONCE
Refills: 0 | Status: COMPLETED | OUTPATIENT
Start: 2022-09-25 | End: 2022-09-25

## 2022-09-25 RX ORDER — ENOXAPARIN SODIUM 100 MG/ML
40 INJECTION SUBCUTANEOUS EVERY 24 HOURS
Refills: 0 | Status: DISCONTINUED | OUTPATIENT
Start: 2022-09-25 | End: 2022-09-26

## 2022-09-25 RX ORDER — ACETAMINOPHEN 500 MG
975 TABLET ORAL ONCE
Refills: 0 | Status: COMPLETED | OUTPATIENT
Start: 2022-09-25 | End: 2022-09-25

## 2022-09-25 RX ORDER — ATORVASTATIN CALCIUM 80 MG/1
40 TABLET, FILM COATED ORAL AT BEDTIME
Refills: 0 | Status: DISCONTINUED | OUTPATIENT
Start: 2022-09-25 | End: 2022-09-26

## 2022-09-25 RX ORDER — METOPROLOL TARTRATE 50 MG
50 TABLET ORAL DAILY
Refills: 0 | Status: DISCONTINUED | OUTPATIENT
Start: 2022-09-25 | End: 2022-09-26

## 2022-09-25 RX ORDER — AMLODIPINE BESYLATE 2.5 MG/1
5 TABLET ORAL DAILY
Refills: 0 | Status: DISCONTINUED | OUTPATIENT
Start: 2022-09-25 | End: 2022-09-26

## 2022-09-25 RX ADMIN — Medication 50 MILLIGRAM(S): at 14:14

## 2022-09-25 RX ADMIN — Medication 25 MILLIGRAM(S): at 21:03

## 2022-09-25 RX ADMIN — Medication 975 MILLIGRAM(S): at 01:54

## 2022-09-25 RX ADMIN — PANTOPRAZOLE SODIUM 40 MILLIGRAM(S): 20 TABLET, DELAYED RELEASE ORAL at 17:42

## 2022-09-25 RX ADMIN — FAMOTIDINE 20 MILLIGRAM(S): 10 INJECTION INTRAVENOUS at 01:55

## 2022-09-25 RX ADMIN — Medication 30 MILLILITER(S): at 01:55

## 2022-09-25 RX ADMIN — Medication 25 MILLIGRAM(S): at 15:41

## 2022-09-25 RX ADMIN — Medication 975 MILLIGRAM(S): at 09:10

## 2022-09-25 RX ADMIN — ENOXAPARIN SODIUM 40 MILLIGRAM(S): 100 INJECTION SUBCUTANEOUS at 13:29

## 2022-09-25 RX ADMIN — LIDOCAINE 5 MILLILITER(S): 4 CREAM TOPICAL at 01:55

## 2022-09-25 RX ADMIN — Medication 81 MILLIGRAM(S): at 13:29

## 2022-09-25 NOTE — ED ADULT NURSE REASSESSMENT NOTE - NS ED NURSE REASSESS COMMENT FT1
Received patient from Brianna RN, patient at baseline mental status, able to make needs known, NAD, VSS, patient agreeable to plan of care, pending admit to Tele. As per MD Floyd, pt ok to take home BP medications. Received patient from Jone MONTERO, patient at baseline mental status, able to make needs known, NAD, VSS, patient agreeable to plan of care, pending admit to Tele. As per MD Floyd, pt ok to take home BP medications.

## 2022-09-25 NOTE — ED PROVIDER NOTE - NS ED ROS FT
GENERAL: No fever, chills  EYES: no vision changes, no discharge.   ENT: no difficulty swallowing or speaking   CARDIAC: +chest pain/pressure, no SOB, lower extremity swelling  PULMONARY: no cough, SOB  GI: +abdominal pain, no n/v/d  : no dysuria  SKIN: no rashes  NEURO: no headache, lightheadedness, paresthesia  MSK: No joint pain, myalgia, weakness.

## 2022-09-25 NOTE — ED PROVIDER NOTE - OBJECTIVE STATEMENT
pt is a 76 yo F with a h/o HTN who presents with chest pain and abd pain that started at 9pm. chest pain is midchest, non radiating, non exertional. has abd pain as well, burning feeling, entire abd. Had outpt MRI that showed coronary artery disease. Has stress test on Monday however chest pain today was worse so came to ED.

## 2022-09-25 NOTE — ED PROVIDER NOTE - ATTENDING CONTRIBUTION TO CARE
Attending (Dhiraj Floyd D.O.):  I have personally seen and examined this patient. I have performed a substantive portion of the visit including all aspects of the medical decision making. Resident, fellow, and/or ACP note reviewed. I agree on the plan of care except where noted.    77F hx of pancreatic head mass, coronary artery atherosclerosis, HTN here for diffuse burning abdominal pain with radiation to chest and weak feeling with vomiting, shortness of breath, diaphoresis. Denies infectious sxs. Has appointment with Dr. Meza tomorrow for stress test. Hemodynamically stable. Benign abdomen, clear lungs. Neurovasc intact. Eval for ACS vs occult infection vs hepatobiliary path with screening labs though lower suspicion given - john. Check labs, cardiac biomarkers,, EKG, CXR, place on cardiac monitor for telemetry monitoring. Given antacids.

## 2022-09-25 NOTE — H&P ADULT - NSHPPHYSICALEXAM_GEN_ALL_CORE
PHYSICAL EXAM: vital signs noted on [facility]  in no apparent distress  HEENT: ELHAM EOMI  Neck: Supple, no JVD, no thyromegaly  Lungs: no wheeze, no crackles  CVS: S1 S2 no M/R/G  Abdomen: no tenderness, no organomegaly, BS present  Neuro: AO x 3 no focal weakness, no sensory abnormalities  Psych: appropriate affect  Skin: warm, dry  Ext: no cyanosis or clubbing, 1 + bilateral edema  Msk: no joint swelling or deformities  Back: no CVA tenderness, no kyphosis/scoliosis

## 2022-09-25 NOTE — H&P ADULT - PATIENT'S PREFERRED PRONOUN
Continuity of Care Form    Patient Name: Hung Bryan   :  1944  MRN:  [id number]    Admit date:  2022  Discharge date:  2022    Code Status Order: [id number]   Advance Directives:     Admitting Physician:  Juan Matamoros DO  PCP: Alban Hay MD    Discharging Nurse: [facility] Unit/Room#: [id number]  Discharging Unit Phone Number: [phone]    Emergency Contact:   Extended Emergency Contact Information  Primary Emergency Contact: [facility] Phone: [phone]  Relation: Brother/Sister  Secondary Emergency Contact: Emmanuel Pagan  Home Phone: [phone]  [facility] I21.4    CAD in native [facility] [facility]  Address:  [address] [place] [address] [facility]  Phone:  [phone]  Fax:  [phone]     / signature: Electronically signed by Kathryn Geronimo Her/She

## 2022-09-25 NOTE — ED PROVIDER NOTE - PROGRESS NOTE DETAILS
Pt's MD is Dr. Jonny Meza. Have paged Pt's MD is Dr. Jonny Meza. spoke to cards Dr. Gonzalez and will admit to Dr. Quiñonez for cards work up.  pt agreeable

## 2022-09-25 NOTE — CONSULT NOTE ADULT - SUBJECTIVE AND OBJECTIVE BOX
[facility]  Francisco Rosario PA-C  [address] [place] [id number]  [phone]      Chief Complaint:  Patient is a 77y old  Female who presents with a chief complaint of sent by cardiology for chest pain and abnormal outpatient MRI (25 Sep 2022 08:47)      HPI:pt is a 78 yo F with a h/o HTN who presents with chest pain and abd pain that started at 9pm. chest pain is midchest, non radiating, non exertional. has abd pain as well, burning feeling, entire abd. Had outpt MRI that showed coronary artery disease. Has stress test on Monday however chest pain today was worse so came to ED.    Allergies:  No Known Allergies      Medications:  amLODIPine   Tablet 5 milliGRAM(s) Oral daily  aspirin enteric coated 81 milliGRAM(s) Oral daily  atorvastatin 40 milliGRAM(s) Oral at bedtime  enoxaparin Injectable 40 milliGRAM(s) SubCutaneous every 24 hours  hydrALAZINE 25 milliGRAM(s) Oral three times a day  metoprolol succinate ER 50 milliGRAM(s) Oral daily  valsartan 320 milliGRAM(s) Oral daily      PMHX/PSHX:  HTN (hypertension)    No significant past surgical history        Family history:  No pertinent family history in first degree relatives        Social History:     ROS:     General:  No wt loss, fevers, chills, night sweats, fatigue,   Eyes:  Good vision, no reported pain  ENT:  No sore throat, pain, runny nose, dysphagia  CV:  No pain, palpitations, hypo/hypertension  Resp:  No dyspnea, cough, tachypnea, wheezing  GI:  + pain, No nausea, No vomiting, No diarrhea, No constipation, No weight loss, No fever, No pruritis, No rectal bleeding, No tarry stools, No dysphagia,  :  No pain, bleeding, incontinence, nocturia  Muscle:  No pain, weakness  Neuro:  No weakness, tingling, memory problems  Psych:  No fatigue, insomnia, mood problems, depression  Endocrine:  No polyuria, polydipsia, cold/heat intolerance  Heme:  No petechiae, ecchymosis, easy bruisability  Skin:  No rash, tattoos, scars, edema      PHYSICAL EXAM:   Vital Signs:  Vital Signs Last 24 Hrs  T(C): 37.1 (25 Sep 2022 12:26), Max: 37.1 (25 Sep 2022 12:26)  T(F): 98.8 (25 Sep 2022 12:26), Max: 98.8 (25 Sep 2022 12:26)  HR: 81 (25 Sep 2022 12:26) (66 - 85)  BP: 159/78 (25 Sep 2022 12:26) (153/53 - 194/81)  BP(mean): 86 (25 Sep 2022 07:19) (86 - 86)  RR: 20 (25 Sep 2022 12:26) (16 - 20)  SpO2: 96% (25 Sep 2022 12:26) (95% - 99%)    Parameters below as of 25 Sep 2022 12:26  Patient On (Oxygen Delivery Method): room air      Daily Height in cm: 160.02 (24 Sep 2022 23:10)    Daily     GENERAL:  Appears stated age,   HEENT:  NC/AT,    CHEST:  Full & symmetric excursion,   HEART:  Regular rhythm  ABDOMEN:  Soft, non-tender, non-distended,   EXTEREMITIES:  no cyanosis,clubbing or edema  SKIN:  No rash  NEURO:  Alert,    LABS:                        11.7   8.26  )-----------( 280      ( 25 Sep 2022 05:24 )             36.1     09-25    142  |  106  |  18  ----------------------------<  115<H>  4.3   |  25  |  0.94    Ca    9.3      25 Sep 2022 05:24  Mg     1.6     09-25    TPro  6.9  /  Alb  4.2  /  TBili  0.2  /  DBili  x   /  AST  17  /  ALT  15  /  AlkPhos  67  09-25    LIVER FUNCTIONS - ( 25 Sep 2022 02:50 )  Alb: 4.2 g/dL / Pro: 6.9 g/dL / ALK PHOS: 67 U/L / ALT: 15 U/L / AST: 17 U/L / GGT: x           PT/INR - ( 25 Sep 2022 01:07 )   PT: 12.3 sec;   INR: 1.07 ratio         PTT - ( 25 Sep 2022 01:07 )  PTT:37.6 sec    Amylase Serum--      Lipase serum37       Ammonia--      Imaging:

## 2022-09-25 NOTE — CONSULT NOTE ADULT - REASON FOR ADMISSION
sent by cardiology for chest pain and abnormal outpatient MRI
sent by cardiology for chest pain and abnormal outpatient MRI

## 2022-09-25 NOTE — ED PROVIDER NOTE - PHYSICAL EXAMINATION
Jenny Elkins MD  GENERAL: Patient awake alert NAD.  HEENT: NC/AT, Moist mucous membranes, EOMI.  LUNGS: CTAB, no wheezes or crackles.   CARDIAC: RRR, no m/r/g.    ABDOMEN: Soft, NT, ND, No rebound, guarding. No CVA tenderness.   EXT: No edema. No calf tenderness.   MSK: No pain with movement, no deformities.  NEURO: A&Ox3. Moving all extremities.  SKIN: Warm and dry. No rash.  PSYCH: Normal affect.

## 2022-09-25 NOTE — H&P ADULT - NSHPREVIEWOFSYSTEMS_GEN_ALL_CORE
Gen: no loss of wt no loss of appetite  ENT: no dizziness no hearing loss  Ophth: no blurring of vision no loss of vision  Resp: No cough no sputum production  CVS: see above HPI   GI: no nausea, vomiting or diarrhea see above HPI   : no dysuria, hematuria  Endo: no polyuria no excessive sweating  Neuro: no weakness no paresthesias  Heme: No petechiae no easy bruising  Msk: No joint pain no swelling  Skin: No rash no itching

## 2022-09-25 NOTE — H&P ADULT - ASSESSMENT
76 yo F with a h/o HTN who presents with chest pain and abd pain admitted for abnormality noted on CT chest suggestive of significant CAD

## 2022-09-25 NOTE — H&P ADULT - HISTORY OF PRESENT ILLNESS
78 yo F with a h/o HTN who presents with chest pain and abd pain that started at 9pm. The chest pain is midchest, non radiating, non exertional. She also has a burning abd pain as well, entire abdomen. Had a CT scan in [facility] that showed coronary artery disease. Came to the Emergency Department secondary to worsening chest pain. Currently chest pain free

## 2022-09-25 NOTE — CONSULT NOTE ADULT - SUBJECTIVE AND OBJECTIVE BOX
CARDIOLOGY ATTENDING    History: 76 y/o female a/w chest pain. She reports the pain as "burning" when she lays down. EKG/NST unremarkable. A CT of the abdomen-pelvis (non-cardiac) confirmed mild coronary calcifications and mild to moderate calcific and noncalcified plaque throughout the thoracoabdominal aorta. In summary she has stable CAD and PAD without angina nor ischemia on NST.      PMHX: Hypertension; Hypercholesteremia, Small hiatal hernia, Mild PAD  PSHX: hysterectomy  Social HX:   Alcohol - SOCIAL DRINKER;   Drugs - NO HISTORY OF DRUG ABUSE;   Smoking - NEVER SMOKED  Fam HX: No Pertinent Family History  Allergies: No Known Drug Allergies    Home Medications:   amlodipine 5 mg tablet 1 TABLET DAILY  chlorthalidone 25 mg tablet 1 TABLET DAILY  hydralazine 25 mg tablet 1 TABLET TID  metoprolol succinate ER 50 mg tablet,extended release 24 hr 1 TABLET DAILY  valsartan 320 mg tablet 1 TABLET DAILY    DATE OF SERVICE - 09-25-22     Review of Systems:   Constitutional: [ ] fevers, [ ] chills.   Skin: [ ] dry skin. [ ] rashes.  Psychiatric: [ ] depression, [ ] anxiety.   Gastrointestinal: [ ] BRBPR, [ ] melena.   Neurological: [ ] confusion. [ ] seizures. [ ] shuffling gait.   Ears,Nose,Mouth and Throat: [ ] ear pain [ ] sore throat.   Eyes: [ ] diplopia.   Respiratory: [ ] hemoptysis. [ ] shortness of breath  Cardiovascular: See HPI above  Hematologic/Lymphatic: [ ] anemia. [ ] painful nodes. [ ] prolonged bleeding.   Genitourinary: [ ] hematuria. [ ] flank pain.   Endocrine: [ ] significant change in weight. [ ] intolerance to heat and cold.     Review of systems [ x] otherwise negative, [ ] otherwise unable to obtain    FH: no family history of sudden cardiac death in first degree relatives      amLODIPine   Tablet 5 milliGRAM(s) Oral daily  aspirin enteric coated 81 milliGRAM(s) Oral daily  atorvastatin 40 milliGRAM(s) Oral at bedtime  enoxaparin Injectable 40 milliGRAM(s) SubCutaneous every 24 hours  hydrALAZINE 25 milliGRAM(s) Oral three times a day  metoprolol succinate ER 50 milliGRAM(s) Oral daily  valsartan 320 milliGRAM(s) Oral daily                            11.7   8.26  )-----------( 280      ( 25 Sep 2022 05:24 )             36.1       09-25    142  |  106  |  18  ----------------------------<  115<H>  4.3   |  25  |  0.94    Ca    9.3      25 Sep 2022 05:24  Mg     1.6     09-25    TPro  6.9  /  Alb  4.2  /  TBili  0.2  /  DBili  x   /  AST  17  /  ALT  15  /  AlkPhos  67  09-25            T(C): 36.4 (09-25-22 @ 14:50), Max: 37.1 (09-25-22 @ 12:26)  HR: 88 (09-25-22 @ 14:50) (66 - 88)  BP: 186/77 (09-25-22 @ 14:50) (153/53 - 194/81)  RR: 18 (09-25-22 @ 14:50) (16 - 20)  SpO2: 97% (09-25-22 @ 14:50) (95% - 99%)  Wt(kg): --    I&O's Summary      General: Well nourished in no acute distress. Alert and Oriented * 3.   Head: Normocephalic and atraumatic.   Neck: No JVD. No bruits. Supple. Does not appear to be enlarged.   Cardiovascular: + S1,S2 ; RRR Soft systolic murmur at the left lower sternal border. No rubs noted.    Lungs: CTA b/l. No rhonchi, rales or wheezes.   Abdomen: + BS, soft. Non tender. Non distended. No rebound. No guarding.   Extremities: No clubbing/cyanosis/edema.   Neurologic: Moves all four extremities. Full range of motion.   Skin: Warm and moist. The patient's skin has normal elasticity and good skin turgor.   Psychiatric: Appropriate mood and affect.  Musculoskeletal: Normal range of motion, normal strength      A/P) 76 y/o female a/w chest pain. She reports the pain as "burning" when she lays down. EKG/NST unremarkable. A CT of the abdomen-pelvis (non-cardiac) confirmed mild coronary calcifications and mild to moderate calcific and noncalcified plaque throughout the thoracoabdominal aorta. In summary she has stable CAD and PAD without angina nor ischemia on NST.    -continue norvasc, valsartan, and hydralazine for hypertension  -continue asa, lipitor and toprol for stable CAD without ischemia  -f/u GI  -no further inpatient cardiac workup expected  -f/u with Susana/Paras after discharge      Cristina Ayon M.D., [facility]  Cardiac Electrophysiology  [facility]  [address] [place] [address], -[address] [place] [address]  [email or web address]    office [phone]  pager [phone]    valsartan 320 milliGRAM(s) Oral daily

## 2022-09-25 NOTE — ED PROVIDER NOTE - CLINICAL SUMMARY MEDICAL DECISION MAKING FREE TEXT BOX
Severo Piper pt is a 78 yo F with a h/o HTN who presents with chest pain and abd pain that started at 9pm. Had outpt MRI that showed atherosclerotic soronary arteriesdisease Severo Piper pt is a 76 yo F with a h/o HTN who presents with chest pain and abd pain that started at 9pm. Had outpt MRI that showed atherosclerotic coronary artery  disease. ddx: acs vs gerd. Severo Piper pt is a 76 yo F with a h/o HTN who presents with chest pain and abd pain that started at 9pm. Had outpt MRI that showed atherosclerotic coronary artery disease. ddx: acs vs gerd.

## 2022-09-25 NOTE — CONSULT NOTE ADULT - ASSESSMENT
abdominal pain    cont diet as tolerated  no dyspeptic symptoms  await cardiac workup  if symptoms persist can do trial of proton pump inhibitor   d/w dtr at bedside

## 2022-09-25 NOTE — H&P ADULT - PROBLEM SELECTOR PLAN 4
small pancreatic 8 mm cyst noted on CT scan prior admission '  likely no intervention at this time except serial CT scan q 6 months to 1 year

## 2022-09-25 NOTE — ED ADULT NURSE NOTE - OBJECTIVE STATEMENT
76 yo F PMHx of HTN and recent Pancreatic lesion found with stress test appointment scheduled for M p/w increased chest discomfort and abd pain. pt has tried nothing for relief. pt described abd pain as a burning. b/l BPs similar.   pt is A&Ox4, MAEW, LS CTA, abd diffusely tender, skin warm dry intact/normal for race, no peripheral edema, no JVD, cap refill< 2 seconds.  Pt denies: headache, dizziness, palpitations, cough, SOB, V/D, urinary symptoms, fevers, chills, weakness at this time.

## 2022-09-26 ENCOUNTER — TRANSCRIPTION ENCOUNTER (OUTPATIENT)
Age: 77
End: 2022-09-26

## 2022-09-26 VITALS
RESPIRATION RATE: 18 BRPM | SYSTOLIC BLOOD PRESSURE: 125 MMHG | HEART RATE: 70 BPM | DIASTOLIC BLOOD PRESSURE: 73 MMHG | OXYGEN SATURATION: 96 % | TEMPERATURE: 98 F

## 2022-09-26 PROCEDURE — 85730 THROMBOPLASTIN TIME PARTIAL: CPT

## 2022-09-26 PROCEDURE — 85025 COMPLETE CBC W/AUTO DIFF WBC: CPT

## 2022-09-26 PROCEDURE — 85027 COMPLETE CBC AUTOMATED: CPT

## 2022-09-26 PROCEDURE — 96375 TX/PRO/DX INJ NEW DRUG ADDON: CPT

## 2022-09-26 PROCEDURE — 80053 COMPREHEN METABOLIC PANEL: CPT

## 2022-09-26 PROCEDURE — 99285 EMERGENCY DEPT VISIT HI MDM: CPT

## 2022-09-26 PROCEDURE — 83690 ASSAY OF LIPASE: CPT

## 2022-09-26 PROCEDURE — 93306 TTE W/DOPPLER COMPLETE: CPT

## 2022-09-26 PROCEDURE — 84443 ASSAY THYROID STIM HORMONE: CPT

## 2022-09-26 PROCEDURE — 71045 X-RAY EXAM CHEST 1 VIEW: CPT

## 2022-09-26 PROCEDURE — 96374 THER/PROPH/DIAG INJ IV PUSH: CPT

## 2022-09-26 PROCEDURE — 85610 PROTHROMBIN TIME: CPT

## 2022-09-26 PROCEDURE — 84484 ASSAY OF TROPONIN QUANT: CPT

## 2022-09-26 PROCEDURE — 83735 ASSAY OF MAGNESIUM: CPT

## 2022-09-26 PROCEDURE — 80048 BASIC METABOLIC PNL TOTAL CA: CPT

## 2022-09-26 PROCEDURE — 36415 COLL VENOUS BLD VENIPUNCTURE: CPT

## 2022-09-26 PROCEDURE — 93306 TTE W/DOPPLER COMPLETE: CPT | Mod: 26

## 2022-09-26 PROCEDURE — 83880 ASSAY OF NATRIURETIC PEPTIDE: CPT

## 2022-09-26 PROCEDURE — 93970 EXTREMITY STUDY: CPT | Mod: 26

## 2022-09-26 PROCEDURE — 93970 EXTREMITY STUDY: CPT

## 2022-09-26 PROCEDURE — 78452 HT MUSCLE IMAGE SPECT MULT: CPT

## 2022-09-26 PROCEDURE — 83036 HEMOGLOBIN GLYCOSYLATED A1C: CPT

## 2022-09-26 PROCEDURE — 93017 CV STRESS TEST TRACING ONLY: CPT

## 2022-09-26 PROCEDURE — A9500: CPT

## 2022-09-26 PROCEDURE — 87637 SARSCOV2&INF A&B&RSV AMP PRB: CPT

## 2022-09-26 RX ORDER — PANTOPRAZOLE SODIUM 20 MG/1
1 TABLET, DELAYED RELEASE ORAL
Qty: 30 | Refills: 0
Start: 2022-09-26 | End: 2022-10-25

## 2022-09-26 RX ORDER — AMLODIPINE BESYLATE 2.5 MG/1
1 TABLET ORAL
Qty: 0 | Refills: 0 | DISCHARGE
Start: 2022-09-26

## 2022-09-26 RX ORDER — ATORVASTATIN CALCIUM 80 MG/1
1 TABLET, FILM COATED ORAL
Qty: 0 | Refills: 0 | DISCHARGE
Start: 2022-09-26

## 2022-09-26 RX ORDER — METOPROLOL TARTRATE 50 MG
1 TABLET ORAL
Qty: 0 | Refills: 0 | DISCHARGE
Start: 2022-09-26

## 2022-09-26 RX ORDER — ASPIRIN/CALCIUM CARB/MAGNESIUM 324 MG
1 TABLET ORAL
Qty: 30 | Refills: 0
Start: 2022-09-26 | End: 2022-10-25

## 2022-09-26 RX ADMIN — PANTOPRAZOLE SODIUM 40 MILLIGRAM(S): 20 TABLET, DELAYED RELEASE ORAL at 06:17

## 2022-09-26 RX ADMIN — ENOXAPARIN SODIUM 40 MILLIGRAM(S): 100 INJECTION SUBCUTANEOUS at 13:29

## 2022-09-26 RX ADMIN — VALSARTAN 320 MILLIGRAM(S): 80 TABLET ORAL at 09:25

## 2022-09-26 RX ADMIN — Medication 25 MILLIGRAM(S): at 13:29

## 2022-09-26 RX ADMIN — AMLODIPINE BESYLATE 5 MILLIGRAM(S): 2.5 TABLET ORAL at 06:16

## 2022-09-26 RX ADMIN — Medication 50 MILLIGRAM(S): at 06:16

## 2022-09-26 RX ADMIN — Medication 25 MILLIGRAM(S): at 06:17

## 2022-09-26 RX ADMIN — Medication 81 MILLIGRAM(S): at 13:28

## 2022-09-26 NOTE — DISCHARGE NOTE PROVIDER - NSDCFUADDAPPT_GEN_ALL_CORE_FT
APPTS ARE READY TO BE MADE: [X] YES    Best Family or Patient Contact (if needed):    Additional Information about above appointments (if needed):    1:   2:   3:     Other comments or requests:    APPTS ARE READY TO BE MADE: [X] YES    Best Family or Patient Contact (if needed):    Additional Information about above appointments (if needed):    1: please follow up with PCP  with xiao week of discharge   2: Please follow up with DR Meza cardiologist   3:     Other comments or requests:    APPTS ARE READY TO BE MADE: [X] YES    Best Family or Patient Contact (if needed):    Additional Information about above appointments (if needed):    1: please follow up with PCP  with xiao week of discharge   2: Please follow up with DR Meza cardiologist   3:  a small pancreatic 8 mm cyst noted on CT scan prior admission - likely no intervention at this time except serial CT scan every 6 months - 1 year , please follow up with DR Causey  about this .     Other comments or requests:

## 2022-09-26 NOTE — PROGRESS NOTE ADULT - SUBJECTIVE AND OBJECTIVE BOX
C A R D I O L O G Y  **********************************     DATE OF SERVICE: 09-26-22    Daughter translated at bedside. Patient currently denies chest pain or shortness of breath.   Review of systems otherwise negative.  	  MEDICATIONS:  MEDICATIONS  (STANDING):  amLODIPine   Tablet 5 milliGRAM(s) Oral daily  aspirin enteric coated 81 milliGRAM(s) Oral daily  atorvastatin 40 milliGRAM(s) Oral at bedtime  enoxaparin Injectable 40 milliGRAM(s) SubCutaneous every 24 hours  hydrALAZINE 25 milliGRAM(s) Oral three times a day  metoprolol succinate ER 50 milliGRAM(s) Oral daily  pantoprazole    Tablet 40 milliGRAM(s) Oral before breakfast  valsartan 320 milliGRAM(s) Oral daily      LABS:	 	    CARDIAC MARKERS:                                11.7   8.26  )-----------( 280      ( 25 Sep 2022 05:24 )             36.1     Hemoglobin: 11.7 g/dL (09-25 @ 05:24)  Hemoglobin: 13.0 g/dL (09-25 @ 01:07)      09-25    142  |  106  |  18  ----------------------------<  115<H>  4.3   |  25  |  0.94    Ca    9.3      25 Sep 2022 05:24  Mg     1.6     09-25    TPro  6.9  /  Alb  4.2  /  TBili  0.2  /  DBili  x   /  AST  17  /  ALT  15  /  AlkPhos  67  09-25    Creatinine Trend: 0.94<--, 1.00<--, 1.08<--    COAGS:       proBNP:   Lipid Profile:   HgA1c:   TSH:       PHYSICAL EXAM:  T(C): 36.6 (09-26-22 @ 12:29), Max: 36.7 (09-25-22 @ 17:07)  HR: 70 (09-26-22 @ 12:29) (70 - 88)  BP: 125/73 (09-26-22 @ 12:29) (125/73 - 186/77)  RR: 18 (09-26-22 @ 12:29) (17 - 18)  SpO2: 96% (09-26-22 @ 12:29) (92% - 97%)  Wt(kg): --  I&O's Summary    25 Sep 2022 07:01  -  26 Sep 2022 07:00  --------------------------------------------------------  IN: 480 mL / OUT: 0 mL / NET: 480 mL    26 Sep 2022 07:01  -  26 Sep 2022 12:43  --------------------------------------------------------  IN: 240 mL / OUT: 0 mL / NET: 240 mL      Gen: Appears well in NAD  HEENT:  (-)icterus (-)pallor  CV: N S1 S2 1/6 TOO (+)2 Pulses B/l  Resp:  Clear to auscultation B/L, normal effort  GI: (+) BS Soft, NT, ND  Lymph:  (-)Edema, (-)obvious lymphadenopathy  Skin: Warm to touch, Normal turgor  Psych: Appropriate mood and affect      TELEMETRY: SR 60-80	      ASSESSMENT/PLAN: 78 y/o female a/w chest pain. She reports the pain as "burning" when she lays down. EKG/NST unremarkable. A CT of the abdomen-pelvis (non-cardiac) confirmed mild coronary calcifications and mild to moderate calcific and noncalcified plaque throughout the thoracoabdominal aorta. In summary she has stable CAD and PAD without angina nor ischemia on NST.    -continue norvasc, valsartan, and hydralazine for hypertension  -continue asa, lipitor and toprol for stable CAD without ischemia  -TTE with normal LV/RV function, no wall motion abnormalities, mod pulm HTN  -follow up GI recs  -D-dimer pending  -no further inpatient cardiac workup expected  -f/u with Susana/Paras after discharge    Tony Pedroza PA-C  Pager: [phone]      
[facility]  Francisco Rosario PA-C  [address] [place] [address]  [phone]      INTERVAL HPI/OVERNIGHT EVENTS:  pt seen and examined. no new events  as per pt feeling better today    MEDICATIONS  (STANDING):  amLODIPine   Tablet 5 milliGRAM(s) Oral daily  aspirin enteric coated 81 milliGRAM(s) Oral daily  atorvastatin 40 milliGRAM(s) Oral at bedtime  enoxaparin Injectable 40 milliGRAM(s) SubCutaneous every 24 hours  hydrALAZINE 25 milliGRAM(s) Oral three times a day  metoprolol succinate ER 50 milliGRAM(s) Oral daily  pantoprazole    Tablet 40 milliGRAM(s) Oral before breakfast  valsartan 320 milliGRAM(s) Oral daily    MEDICATIONS  (PRN):      Allergies    No Known Allergies    Intolerances        ROS:   General:  No wt loss, fevers, chills, night sweats, fatigue,   Eyes:  Good vision, no reported pain  ENT:  No sore throat, pain, runny nose, dysphagia  CV:  No pain, palpitations, hypo/hypertension  Resp:  No dyspnea, cough, tachypnea, wheezing  GI:  No pain, No nausea, No vomiting, No diarrhea, No constipation, No weight loss, No fever, No pruritis, No rectal bleeding, No tarry stools, No dysphagia,  :  No pain, bleeding, incontinence, nocturia  Muscle:  No pain, weakness  Neuro:  No weakness, tingling, memory problems  Psych:  No fatigue, insomnia, mood problems, depression  Endocrine:  No polyuria, polydipsia, cold/heat intolerance  Heme:  No petechiae, ecchymosis, easy bruisability  Skin:  No rash, tattoos, scars, edema      PHYSICAL EXAM:   Vital Signs:  Vital Signs Last 24 Hrs  T(C): 36.6 (26 Sep 2022 12:29), Max: 36.7 (25 Sep 2022 17:07)  T(F): 97.8 (26 Sep 2022 12:29), Max: 98.1 (26 Sep 2022 05:17)  HR: 70 (26 Sep 2022 12:29) (70 - 88)  BP: 125/73 (26 Sep 2022 12:29) (125/73 - 186/77)  BP(mean): --  RR: 18 (26 Sep 2022 12:29) (17 - 18)  SpO2: 96% (26 Sep 2022 12:29) (92% - 97%)    Parameters below as of 26 Sep 2022 12:29  Patient On (Oxygen Delivery Method): room air      Daily     Daily     GENERAL:  Appears stated age,   HEENT:  NC/AT,    CHEST:  Full & symmetric excursion,   HEART:  Regular rhythm,  ABDOMEN:  Soft, non-tender, non-distended,  EXTEREMITIES:  no cyanosis  SKIN:  No rash  NEURO:  Alert,       LABS:                        11.7   8.26  )-----------( 280      ( 25 Sep 2022 05:24 )             36.1     09-25    142  |  106  |  18  ----------------------------<  115<H>  4.3   |  25  |  0.94    Ca    9.3      25 Sep 2022 05:24  Mg     1.6     09-25    TPro  6.9  /  Alb  4.2  /  TBili  0.2  /  DBili  x   /  AST  17  /  ALT  15  /  AlkPhos  67  09-25    PT/INR - ( 25 Sep 2022 01:07 )   PT: 12.3 sec;   INR: 1.07 ratio         PTT - ( 25 Sep 2022 01:07 )  PTT:37.6 sec      RADIOLOGY & ADDITIONAL TESTS:  
Patient is a 77y old  Female who presents with a chief complaint of sent by cardiology for chest pain and abnormal outpatient MRI (26 Sep 2022 13:53)      DATE OF SERVICE: 09-26-22 @ 15:51    SUBJECTIVE / OVERNIGHT EVENTS: overnight events noted  daughter at bedside  patient declined use of  services, preferred daughter at bedside translate instead     ROS:  Resp: No cough no sputum production  CVS: No chest pain no palpitations no orthopnea  GI: no N/V/D  : no dysuria, no hematuria  Neuro: no weakness no paresthesias  Heme: No petechiae no easy bruising  Msk: No joint pain no swelling  Skin: No rash no itching        MEDICATIONS  (STANDING):  amLODIPine   Tablet 5 milliGRAM(s) Oral daily  aspirin enteric coated 81 milliGRAM(s) Oral daily  atorvastatin 40 milliGRAM(s) Oral at bedtime  enoxaparin Injectable 40 milliGRAM(s) SubCutaneous every 24 hours  hydrALAZINE 25 milliGRAM(s) Oral three times a day  metoprolol succinate ER 50 milliGRAM(s) Oral daily  pantoprazole    Tablet 40 milliGRAM(s) Oral before breakfast  valsartan 320 milliGRAM(s) Oral daily    MEDICATIONS  (PRN):        CAPILLARY BLOOD GLUCOSE        I&O's Summary    25 Sep 2022 07:01  -  26 Sep 2022 07:00  --------------------------------------------------------  IN: 480 mL / OUT: 0 mL / NET: 480 mL    26 Sep 2022 07:01  -  26 Sep 2022 15:51  --------------------------------------------------------  IN: 420 mL / OUT: 0 mL / NET: 420 mL        Vital Signs Last 24 Hrs  T(C): 36.6 (26 Sep 2022 12:29), Max: 36.7 (25 Sep 2022 17:07)  T(F): 97.8 (26 Sep 2022 12:29), Max: 98.1 (26 Sep 2022 05:17)  HR: 70 (26 Sep 2022 12:29) (70 - 84)  BP: 125/73 (26 Sep 2022 12:29) (125/73 - 161/73)  BP(mean): --  RR: 18 (26 Sep 2022 12:29) (17 - 18)  SpO2: 96% (26 Sep 2022 12:29) (92% - 96%)    PHYSICAL EXAM:   HEENT: ELHAM EOMI  Neck: Supple,  Lungs: no wheeze  CVS: S1 S2 no M/R/G  Abdomen: no tenderness, no organomegaly, BS present  Neuro: AO x 3 no focal weakness, no sensory abnormalities  Psych: appropriate affect  Skin: warm, dry  Ext: no edema  Msk: no joint swelling or deformities  Back: no CVA tenderness, no kyphosis/scoliosis    LABS:                        11.7   8.26  )-----------( 280      ( 25 Sep 2022 05:24 )             36.1     09-25    142  |  106  |  18  ----------------------------<  115<H>  4.3   |  25  |  0.94    Ca    9.3      25 Sep 2022 05:24  Mg     1.6     09-25    TPro  6.9  /  Alb  4.2  /  TBili  0.2  /  DBili  x   /  AST  17  /  ALT  15  /  AlkPhos  67  09-25    PT/INR - ( 25 Sep 2022 01:07 )   PT: 12.3 sec;   INR: 1.07 ratio         PTT - ( 25 Sep 2022 01:07 )  PTT:37.6 sec            All consultant(s) notes reviewed and care discussed with other providers        Contact Number, Dr Sykes [phone]

## 2022-09-26 NOTE — DISCHARGE NOTE PROVIDER - NSDCCPCAREPLAN_GEN_ALL_CORE_FT
Patient:   DERRICK SANTOS            MRN: [id number]            FIN: [id number]              Age:   68 years     Sex:  FEMALE     :  47   Associated Diagnoses:   None   Author:   Ankush Coates     Basic Information   History source:  Patient,   No overt bleeding.   Stool remains orange/brown in color.  Receiving blood transfusion.   Seen by colorectal surgery.   Consideration of colonoscopy is underway.   Afebrile.  Was on pressors (now off).   Medications (14) Active  Scheduled: (4)  Influenza virus vaccine, inactivated PF (latex free) quadrivalent 0.5 mL IM inj SDV  0.5 mL, IM, On Call  mirtazapine  30 mg, Oral, Q Bedtime  piperacillin-tazobactam  3.375 gm, IVPB, Q8H  Pneumococcal adult vaccine 23-polyvalent 0.5 mL IM inj SDV  0.5 mL, IM, On Call  Continuous: (5)  Dextrose 5% 1,000 mL  1,000 mL, IV, 20 mL/hr  insulin regular 100 unit [1 unit/hr] + Sodium Chloride 0.9% 100 mL  100 mL, IV, 1 mL/hr  NORepinephrine 8 mg [1 mcg/min] + premixed in Sodium Chloride 0.9% 250 mL  250 mL, IV, 1.88 mL/hr  Sodium Chloride 0.9% 1,000 mL  1,000 mL, IV, 80 mL/hr  Sodium Chloride 0.9% 250 mL  250 mL, IV, 500 mL/hr  PRN: (5)  Dextrose (glucose) 40% 15 gm/37.5 gm oral gel UD  15 gm, Oral, As Directed PRN  Dextrose (glucose) 50% 25 gm/50 mL syringe  12.5 gm 25 mL, IV Push, As Directed PRN  Glucagon 1 mg/1 mL emergency kit SDV  1 mg 1 mL, IM, As Directed PRN  Insulin human regular 100 unit/mL inj 3 mL BULK  2-7 units, IV Push, As Directed PRN  Morphine PF 2 mg/1 mL inj SDV  2 mg 1 mL, IV Push, Q2H   Allergies (1) Active Reaction  NKA None Documented .      Physical Examination   VS/Measurements     Vitals between:   23-OCT-2019 09:40:36   TO   24-OCT-2019 09:40:36                   LAST RESULT MINIMUM MAXIMUM  Temperature 35.4 35.0 36.2  Heart Rate 67 67 100  Respiratory Rate 12 12 22  NISBP           80 80 119  NIDBP           50 47 71  NIMBP           59 59 82  SpO2                    99 96 100    General:  Alert  and oriented, No acute distress, Cachectic.    Eye:  Normal conjunctiva, No scleral icterus.    Respiratory:  Breath sounds are equal, Symmetrical chest wall expansion, No retractions.  No paradoxical respirations.   Cardiovascular:  Normal rate, No edema.    Gastrointestinal:  Soft, Non-tender, Non-distended, Normal bowel sounds, Stool brown/orange in color.   Musculoskeletal:  Calves soft, nontender.    Integumentary:  Warm, Dry.    Neurologic:  Alert, Oriented.    Psychiatric:  Flat affect.      Review / Management   Laboratory results:     Labs between:  23-OCT-2019 09:40 to 24-OCT-2019 09:40  CBC:                 WBC  HgB  Hct  Plt  MCV  RDW   24-OCT-2019 6.0  (L) 8.5  (L) 25.8  192  94.2  (H) 18.6   23-OCT-2019   (L) 8.2  (L) 24.7         23-OCT-2019   (L) 8.8  (L) 26.7         23-OCT-2019   (L) 9.0  (L) 27.9         DIFF:                 Seg  Neutroph//ABS  Lymph//ABS  Mono//ABS  EOS/ABS  24-OCT-2019 82  // 5.3 // (L) 0.3  // 0.4 // (L) 0.0   BMP:                 Na  Cl  BUN  Glu   24-OCT-2019 136  (H) 108  9  70                              K  CO2  Cr  Ca                              3.6  21  0.55  (L) 7.1   CMP:                 AST  ALT  AlkPhos  Bili  Albumin   24-OCT-2019 36  22  73  0.5  (L) 1.4   Other Chem:             Mg  Phos  Triglycerides  GGTP  DirectBili                           1.7  2.5         POC GLU:                 Latest Result  Latest Date  Minimum  Min Date  Maximum  Max Date                             (H) 108  23-OCT-2019 (H) 108  23-OCT-2019 76 23-OCT-2019                 .      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  We have been consulted to assist in the evaluation of acute blood loss anemia and hematochezia.   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 Ian Zamudio (GI)  Ankush Mercado DO  Gastroenterology Fellow   .     .  Patient seen and independently examined with GI team agree all findings and chart personally reviewed.   I was present during the key elements of the history, examination and decision making.  I agree with the documented recommendations presented by the GI team/house staff.  Please see GI team Fellow/resident/student note for details:  Thank you allowing us to participate in the care of this interesting patient.   I independently reviewed all imaging and lab tests pertinent the consultation request.       Agree with as stated  Appreciate colorectal surgical evaluation  Agree with discontinuing anticoagulation if at all possible given patient's persistent deferral of ostomy placement and limited ability to obtain chemotherapeutic agents  Encourage goals of care discussion  Diet as tolerated/indicated  Transfusions as indicated with a goal hemoglobin of at least 7  Dr. Ian Zamudio DO  [facility] [place]  [phone]  (timing of the patient encounter may not correlate with documentation time)   PRINCIPAL DISCHARGE DIAGNOSIS  Diagnosis: Chest pain  Assessment and Plan of Treatment: You were evaluated by cardiology - no acute ischemia noted.   Call your doctor if you have any new pain, pressure, or discomfort in the center of your chest, pain, tingling or discomfort in arms, back, neck, jaw, or stomach, shortness of breath, nausea, vomiting, burping or heartburn, sweating, cold and clammy skin, racing or abnormal heartbeat for more than 10 minutes or if they keep coming & going.  Call 911 and do not try to get to hospital by car.        SECONDARY DISCHARGE DIAGNOSES  Diagnosis: Pancreatic cyst  Assessment and Plan of Treatment: Please follow up for serial CT scan every 6 months to 1 year for continued monitoring    Diagnosis: Leg swelling  Assessment and Plan of Treatment: [facility] duplex of your lower extremities were negative for new clots.    Diagnosis: Abdominal pain  Assessment and Plan of Treatment: Follow-up with your primary care physician within 1 week. Call for appointment.  Please bring all discharge paperwork and list of medications to all follow up appointments  Please call for follow up appointments one day after discharge

## 2022-09-26 NOTE — DISCHARGE NOTE NURSING/CASE MANAGEMENT/SOCIAL WORK - NSDCPEFALRISK_GEN_ALL_CORE
For information on Fall & Injury Prevention, visit: [email or web address] OR  [email or web address] OR  https://www.cdc.gov/steadi/patient.html

## 2022-09-26 NOTE — PATIENT PROFILE ADULT - FALL HARM RISK - HARM RISK INTERVENTIONS

## 2022-09-26 NOTE — DISCHARGE NOTE PROVIDER - PROVIDER TOKENS
PROVIDER:[TOKEN:[[id number]],FOLLOWUP:[1 week]],PROVIDER:[TOKEN:[[id number]],FOLLOWUP:[1 week]] PROVIDER:[TOKEN:[[id number]],FOLLOWUP:[1 week]],PROVIDER:[TOKEN:[[id number]],FOLLOWUP:[1 week]],PROVIDER:[TOKEN:[[id number]]]

## 2022-09-26 NOTE — DISCHARGE NOTE PROVIDER - CARE PROVIDER_API CALL
Gold Causey  INTERNAL MEDICINE  [address] [place] [address]  Phone: [phone]  Fax: [phone]  Follow Up Time: 1 week    Joe Meza)  Cardiology  [address] [place] [address], [address] [place] [address]  Phone: [phone]  Fax: [phone]  Follow Up Time: 1 week   Gold Causey  INTERNAL MEDICINE  [address] [place] [address]  Phone: [phone]  Fax: [phone]  Follow Up Time: 1 week    Joe Meza)  Cardiology  [address] [place] [address], [address]  [place] [address]  Phone: [phone]  Fax: [phone]  Follow Up Time: 1 week    Taras Luis ()  Gastroenterology; Internal Medicine  [address] [place] [address]  Phone: [phone]  Fax: [phone]  Follow Up Time:

## 2022-09-26 NOTE — PROGRESS NOTE ADULT - PROBLEM SELECTOR PLAN 1
stress test negative  echocardiogram normal LV function   cleared for discharge by cardiology  outpatient follow up with cardiology

## 2022-09-26 NOTE — DISCHARGE NOTE NURSING/CASE MANAGEMENT/SOCIAL WORK - PATIENT PORTAL LINK FT
You can access the FollowMyHealth Patient Portal offered by [facility] by registering at the following website: [email or web address]. By joining [facility]’s FollowMyHealth portal, you will also be able to view your health information using other applications (apps) compatible with our system.

## 2022-09-26 NOTE — DISCHARGE NOTE PROVIDER - NSDCMRMEDTOKEN_GEN_ALL_CORE_FT
amLODIPine 5 mg oral tablet: 1 tab(s) orally once a day  hydrALAZINE 25 mg oral tablet: 1 tab(s) orally 3 times a day  Lipitor 40 mg oral tablet: 1 tab(s) orally once a day  metoprolol succinate 50 mg oral tablet, extended release: 1 tab(s) orally once a day  valsartan 320 mg oral tablet: 1 tab(s) orally once a day   amLODIPine 5 mg oral tablet: 1 tab(s) orally once a day  aspirin 81 mg oral delayed release tablet: 1 tab(s) orally once a day  atorvastatin 40 mg oral tablet: 1 tab(s) orally once a day (at bedtime)  hydrALAZINE 25 mg oral tablet: 1 tab(s) orally 3 times a day  metoprolol succinate 50 mg oral tablet, extended release: 1 tab(s) orally once a day  pantoprazole 40 mg oral delayed release tablet: 1 tab(s) orally once a day (before a meal)  valsartan 320 mg oral tablet: 1 tab(s) orally once a day

## 2022-09-26 NOTE — PROGRESS NOTE ADULT - REASON FOR ADMISSION
sent by cardiology for chest pain and abnormal outpatient MRI

## 2022-09-26 NOTE — DISCHARGE NOTE NURSING/CASE MANAGEMENT/SOCIAL WORK - NSDCFUADDAPPT_GEN_ALL_CORE_FT
APPTS ARE READY TO BE MADE: [X] YES    Best Family or Patient Contact (if needed):    Additional Information about above appointments (if needed):    1: please follow up with PCP  with xiao week of discharge   2: Please follow up with DR Meza cardiologist   3:  a small pancreatic 8 mm cyst noted on CT scan prior admission - likely no intervention at this time except serial CT scan every 6 months - 1 year , please follow up with DR Causey  about this .     Other comments or requests:

## 2022-09-26 NOTE — DISCHARGE NOTE PROVIDER - HOSPITAL COURSE
HPI: 77 year old Female with a h/o HTN who presents with chest pain and abdominal pain that started at 9pm on 9/24. The chest pain is midchest, non radiating, non exertional. She also has a burning abd pain as well, entire abdomen. Had a CT scan in [place]  that showed coronary artery disease. Came to the Emergency Department secondary to worsening chest pain. Currently chest pain free    Hospital Course: HsTroponin noted to 6 --> 7. Cardiology and GI were consulted. EKG/NST unremarkable. A CT of the abdomen-pelvis (non-cardiac) confirmed mild coronary calcifications and mild to moderate calcific and noncalcified plaque throughout the thoracoabdominal aorta. Per cards - she has stable CAD and PAD without angina nor ischemia on NST; recommending to continue norvasc, valsartan, and hydralazine for hypertension. Continue asa, lipitor and toprol for stable CAD without ischemia. TTE 9/26/22 with normal LV/RV function, no wall motion abnormalities, mod pulm HTN. No further inpatient cardiac workup expected. Patient to f/u with Susana/Paras after discharge. Per GI - continue diet as tolerated; if symptoms persist can do trial of proton pump inhibitor. Of note, a small pancreatic 8 mm cyst noted on CT scan prior admission - likely no intervention at this time except serial CT scan q 6 months to 1 year.      Discharge/Dispo/Med rec discussed with attending. Patient medically cleared for discharge with outpatient follow up with PCP/cardiology.     HPI: 77 year old Female with a h/o HTN who presents with chest pain and abdominal pain that started at 9pm on 9/24. The chest pain is midchest, non radiating, non exertional. She also has a burning abd pain as well, entire abdomen. Had a CT scan in [place]  that showed coronary artery disease. Came to the Emergency Department secondary to worsening chest pain. Currently chest pain free    Hospital Course: HsTroponin noted to 6 --> 7. Cardiology and GI were consulted. EKG/NST unremarkable. A CT of the abdomen-pelvis (non-cardiac) confirmed mild coronary calcifications and mild to moderate calcific and noncalcified plaque throughout the thoracoabdominal aorta. Per cards - she has stable CAD and PAD without angina nor ischemia on NST; recommending to continue norvasc, valsartan, and hydralazine for hypertension. Continue asa, lipitor and toprol for stable CAD without ischemia. TTE 9/26/22 with normal LV/RV function, no wall motion abnormalities, mod pulm HTN. No further inpatient cardiac workup expected. Patient to f/u with Susana/Paras after discharge. Per GI - continue diet as tolerated; if symptoms better on a  trial of proton pump inhibitor.   Of note, a small pancreatic 8 mm cyst noted on CT scan prior admission - likely no intervention at this time except serial CT scan q 6 months to 1 year.      Discharge/Dispo/Med rec discussed with attending. Patient medically cleared for discharge with outpatient follow up with PCP/cardiology.

## 2022-09-26 NOTE — PROGRESS NOTE ADULT - ASSESSMENT
abdominal pain    cont diet as tolerated  no dyspeptic symptoms  as per cardio, no further w/u necessary   if symptoms persist can do trial of proton pump inhibitor   d/w dtr at bedside    
78 yo F with a h/o HTN who presents with chest pain and abd pain admitted for abnormality noted on CT chest suggestive of significant CAD

## 2022-09-27 NOTE — CHART NOTE - NSCHARTNOTEFT_GEN_A_CORE
(1) attempt(s) were made to reach patient, which have been unsuccessful. Unable to leave voicemail on (9/26).
(2) attempt(s) were made to reach patient, which have been unsuccessful. Unable to leave voicemail on 9/27.

## 2022-10-06 ENCOUNTER — TRANSCRIPTION ENCOUNTER (OUTPATIENT)
Age: 77
End: 2022-10-06

## 2023-06-25 NOTE — H&P ADULT - PROBLEM SELECTOR PROBLEM 5
2019 novel coronavirus disease (COVID-19) 41-year-old female past medical history thyroid CA status post thyroidectomy history of cholecystectomy Presents with 1 day of right-sided abdominal pain  associated with nausea vomiting.  Vital signs stable.  Exam with right-sided tenderness right lower quadrant tenderness no CVA tenderness.  Concern for appendicitis versus colitis.  Lower concern for retained stone in biliary ducts/choledocho versus pancreatitis versus PUD. lower concern for UTI versus pyelonephritis.   Plan Labs UA IVF Meds CT AP and r/a 41-year-old female past medical history thyroid CA status post thyroidectomy history of cholecystectomy Presents with 1 day of right-sided abdominal pain  associated with nausea vomiting.  Vital signs stable.  Exam with right-sided tenderness right lower quadrant tenderness no CVA tenderness.  Concern for appendicitis versus colitis.  Lower concern for retained stone in biliary ducts/choledocho versus pancreatitis versus PUD. lower concern for UTI versus pyelonephritis.   Plan Labs UA IVF Meds CT AP and r/a    Anshul: 41 year old female with pmhx of thyroid cancer, phsx cholecystectomy 8 years ago, history of ovarian cysts here with R sided abdominal pain, with n/v for past two days. no fever, no chills. no dysuria/hematuria. went to UC referred to ER. not able to tolerate PO.  PE: mild distress secondary to pain, lungs cta b/l, + s1s2, abdomen soft ttp right upper quadrant, right mid abdomen, no cva tenderness, no c/c/e.alert and oriented x 3. will get labs, pain control, nausea meds, ivf, ct a/p r/o retained gallstone, versus pancreatitis versus colitis. will reassess after ct.

## 2023-11-16 NOTE — ED PROVIDER NOTE - ATTENDING WITH...
Nephrology  Patient: Shai Cornejo UPVP:   : 1943 Attending: Corbin Reina MD   78year old male        Chief complaint: ESRD on HD, Hyperkalemia, volume overload    HPI from initial consult: This is a 78year old male with a past medical history significant for ESRD on HD, HTN, dm type 2,  CHF who presented to [facility] ED  complaining of bilateral lower extremity edema. He missed HD for more than  a week  and came to ED for HD and worsening swelling   His K was 6.2 at the ED   Patient denies any chest pain or shortness of breath.     Nephrology consulted for ESRD       Subjective:   2023  Stable breath, no cp, No event overnight   Seen on HD   Past Medical History:   Diagnosis Date   â¢ Acquired coagulation factor deficiency (CMD)    â¢ Anxiety    â¢ Arthritis     legs   â¢ Asthma    â¢ Atrial fibrillation (CMD)    â¢ Blood clot associated with vein wall inflammation        â¢ CHF (congestive heart failure) (CMD)    â¢ Chronic kidney disease (CKD), stage III (moderate) (CMD)    â¢ Coronary artery disease, PCI RCA 2016   â¢ Depression    â¢ Diabetes mellitus (CMD)     retinopathy with neuropathy   â¢ Diabetic gastroparesis (CMD)    â¢ GI bleed 2009    Coumadin stopped   â¢ Gout    â¢ Hemodialysis patient (CMD)     M-W-F at [address]  // Permcath   â¢ Hyperkalemia    â¢ Hypertension    â¢ Hypothyroidism 3/8/2015   â¢ Memory deficit     some memory difficulty    â¢ Multinodular goiter    â¢ Noncompliance with renal dialysis 10/11/2022   â¢ Obesity    â¢ Onychomycosis 2013   â¢ MAURICE (obstructive sleep apnea)     has machine but was told he doesn't need anymore   â¢ Other and unspecified hyperlipidemia    â¢ Other chronic pain     back pain    â¢ Pain in both feet 2013   â¢ Paroxysmal atrial fibrillation (CMD)    â¢ PE (pulmonary embolism)    â¢ Pneumonia    â¢ Pulmonary hypertension (CMD)    â¢ Retinopathy    â¢ Schizophrenia (CMD)    â¢ Vitamin D deficiency        Past Surgical History:   Procedure Laterality Date   â¢ Av fistula placement Right 2015    clotted since 2016   â¢ Cardioversion  2006   â¢ Colonoscopy diagnostic  10/2/14    3yr recall, 1 polyp tubular adenoma   â¢ Colonoscopy w/ polypectomy  2009   â¢ Coronary angiogram - cv  2006    nl cor, sev PHTN   â¢ Echocardiogram  2010    EF 62%   â¢ Esophagogastroduodenoscopy transoral flex w/bx single or mult  2006    EGD with Bx   â¢ Laceration repair  1970    Stitches left arm, steel fell on his left arm while he was at work, he was 32years old. â¢ Permacath  2017    placed for dialysis while in hospital   â¢ Thyroidectomy  10/14/2014    total thyroidectomy        Social History     Socioeconomic History   â¢ Marital status: Single     Spouse name: Not on file   â¢ Number of children: 0   â¢ Years of education: Not on file   â¢ Highest education level: Not on file   Occupational History   â¢ Not on file   Tobacco Use   â¢ Smoking status: Former     Current packs/day: 0.00     Types: Cigarettes     Quit date: 3/19/1959     Years since quittin.7   â¢ Smokeless tobacco: Never   Vaping Use   â¢ Vaping Use: never used   Substance and Sexual Activity   â¢ Alcohol use: Yes     Alcohol/week: 4.0 standard drinks of alcohol     Types: 4 Cans of beer per week   â¢ Drug use: Not Currently   â¢ Sexual activity: Not Currently     Partners: Female   Other Topics Concern   â¢ Not on file   Social History Narrative    Lives alone in apartment, has PCW and RN to help with medications     Social Determinants of Health     Financial Resource Strain: Low Risk  (2023)    Financial Resource Strain    â¢ Social Determinants: Financial Resource Strain: None   Food Insecurity: No Food Insecurity (2023)    Food Insecurity    â¢ Social Determinants: Food Insecurity: Never   Transportation Needs: No Transportation Needs (2023)    PRAPARE - Transportation    â¢ Lack of Transportation (Medical):  No    â¢ Lack of "Transportation (Non-Medical):  No   Physical Activity: Insufficiently Active (2/11/2021)    Exercise Vital Sign    â¢ Days of Exercise per Week: 7 days    â¢ Minutes of Exercise per Session: 10 min   Stress: Not on file   Social Connections: Socially Integrated (11/14/2023)    Social Connections    â¢ Social Determinants: Social Connections: 3 to 5 times a week   Recent Concern: Social Connections - Socially Isolated (9/6/2023)    Social Connections    â¢ Social Determinants: Social Connections: Less than once a week   Intimate Partner Violence: Not At Risk (11/14/2023)    Intimate Partner Violence    â¢ Social Determinants: Intimate Partner Violence Past Fear: No    â¢ Social Determinants: Intimate Partner Violence Current Fear: No       Family History   Problem Relation Age of Onset   â¢ Cancer Mother         Ovarian   â¢ Diabetes Mother    â¢ Hypertension Mother    â¢ High blood pressure Mother    â¢ Other Father         Ulcers   â¢ Diabetes Brother    â¢ High blood pressure Daughter    â¢ Heart disease Daughter    â¢ Coronary Artery Disease Other         no family hx premature CAD       ALLERGIES:   Allergen Reactions   â¢ Lisinopril Angioedema   â¢ Nsaids      ulcer   â¢ Latex   (Environmental) Dermatitis   â¢ Metolazone Other (See Comments)     May have contributed to High uric acid level 8/2015 hospitalization         Review of Systems:    As detailed in presenting history, all other systems reviewed negative    Vital Last Value 24 Hour Range   Temperature 98.1 Â°F (36.7 Â°C) Temp  Min: 97.8 Â°F (36.6 Â°C)  Max: 98.6 Â°F (37 Â°C)   Pulse 61 Pulse  Min: 57  Max: 85   Respiratory (!) 20 Resp  Min: 18  Max: 20   Blood Pressure (!) 153/90 BP  Min: 138/64  Max: 187/69   Art BP   No data recorded   Pulse Oximetry 100 % SpO2  Min: 92 %  Max: 100 %     Vital Today Admitted   Weight 105.4 kg (232 lb 5.8 oz) Weight: 112 kg (246 lb 14.6 oz)   Height N/A Height: 5' 11"" (180.3 cm)   BMI N/A BMI (Calculated): 34.44     Weight over the past 48 " Hours:  Patient Vitals for the past 48 hrs:   Weight   11/14/23 1403 112 kg (246 lb 14.6 oz)   11/14/23 1800 112.1 kg (247 lb 2.2 oz)   11/14/23 2000 112.2 kg (247 lb 5.7 oz)   11/14/23 2330 109.2 kg (240 lb 11.9 oz)   11/15/23 0949 108.7 kg (239 lb 10.2 oz)   11/15/23 1050 108.7 kg (239 lb 10.2 oz)   11/15/23 1434 105.8 kg (233 lb 4 oz)   11/16/23 0700 105.4 kg (232 lb 5.8 oz)        Hemodynamics:      Last Value 24 Hour Range   CVP   No data recorded   PAS/PAD   No data recorded   PCWP   No data recorded   CO   No data recorded   CI   No data recorded   SVR   No data recorded   SV02   No data recorded     Intake/Output:    Last Stool Occurrence: 1 (11/15/23 2320)    No intake/output data recorded. I/O last 3 completed shifts: In: 230 [P.O.:230]  Out: 3000 [Other:3000]      Intake/Output Summary (Last 24 hours) at 11/16/2023 1043  Last data filed at 11/16/2023 9148  Gross per 24 hour   Intake 220 ml   Output 3000 ml   Net -2780 ml       Medications/Infusions:  Medications Prior to Admission   Medication Sig Dispense Refill   â¢ Iron Sucrose (VENOFER IV) Inject 100 mg into the vein 3 days a week. During dialysis treatment. â¢ omeprazole (PriLOSEC) 40 MG capsule Take 40 mg by mouth daily. â¢ docusate sodium (COLACE) 100 MG capsule TAKE 1 CAPSULE BY MOUTH ONCE DAILY 30 capsule 0   â¢ pantoprazole (PROTONIX) 40 MG tablet Take 1 tablet by mouth daily (before breakfast). 90 tablet 0   â¢ amLODIPine (NORVASC) 10 MG tablet Take 1 tablet by mouth daily. 90 tablet 0   â¢ aspirin (ECOTRIN) 81 MG EC tablet Take 1 tablet by mouth daily. 90 tablet 0   â¢ calcitRIOL (ROCALTROL) 0.25 MCG capsule Take 1 capsule by mouth 3 days a week. (Patient taking differently: Take 0.25 mcg by mouth 3 days a week. During dialysis treatment.) 30 capsule 0   â¢ calcium carbonate (Calcium Antacid) 500 MG chewable tablet Chew 1 tablet by mouth daily for supplement.  30 tablet 0   â¢ carvedilol (COREG) 3.125 MG tablet Take 1 tablet by mouth in the morning and 1 tablet in the evening. Take with meals. 120 tablet 0   â¢ isosorbide mononitrate (IMDUR) 30 MG 24 hr tablet Take 1 tablet by mouth daily. 90 tablet 0   â¢ levothyroxine 200 MCG tablet Take 1 tablet by mouth daily. 90 tablet 0   â¢ losartan (COZAAR) 100 MG tablet Take 1 tablet by mouth daily. 90 tablet 0   â¢ pravastatin (PRAVACHOL) 40 MG tablet Take 1 tablet by mouth daily. 90 tablet 0   â¢ cetirizine (ZyrTEC) 10 MG tablet *PILL BOTTLE* TAKE ONE TABLET BY MOUTH ONCE A DAY 30 tablet 3   â¢ Icy Hot Original Pain Relief 2.5 % Gel APPLY TOPICALLY TO SHOULDER AS NEEDED (MENTHOL 2.5% GEL) 70.8 g 11   â¢ Multiple Vitamins-Minerals (Thera-M) Tab *PILL BOTTLE* TAKE ONE TABLET BY MOUTH ONCE A DAY 90 tablet 3   â¢ polyethylene glycol (MIRALAX) 17 GM/SCOOP powder Take 17 g by mouth daily. Stir and dissolve powder in any 4 to 8 ounces of beverage, then drink. 476 g 3   â¢ D3 Super Strength 50 mcg (2,000 units) capsule TAKE ONE CAPSULE BY MOUTH DAILY FOR VIT D DEFICIENCY **DO NOT CRUSH* 30 capsule 11   â¢ Sennosides (Senna) 8.6 MG Tab Take 1 tablet by mouth daily. 30 tablet 11   â¢ acetaminophen (TYLENOL) 500 MG tablet Take 1 tablet by mouth every 4 hours as needed for Pain. 60 tablet 3   â¢ Lokelma 10 g packet for oral suspension EMPTY 1 PACKET IN 3 TABLESPOONSFUL OF WATER OR AS DIRECTED, STIR WELL, AND  DRINK AT ONCE ON NON DIALYSIS DAYS     â¢ sevelamer carbonate (RENVELA) 800 MG tablet Take 800 mg by mouth as directed. Take 1 tablet by mouth 3 times daily with meals and 1 tablet by mouth daily with a snack.      â¢ ammonium lactate (AMLACTIN) 12 % lotion APPLY TO AFFECTED AREA FOUR TIMES A  g 10     Current Facility-Administered Medications   Medication Dose Route Frequency Provider Last Rate Last Admin   â¢ aspirin (ECOTRIN) enteric coated tablet 81 mg  81 mg Oral Daily Dg Abdalla MD   81 mg at 11/16/23 7213   â¢ levothyroxine (SYNTHROID, LEVOTHROID) tablet 200 mcg  200 mcg Oral Daily Dg Abdalla MD   200 mcg at 11/16/23 0853   â¢ amLODIPine (NORVASC) tablet 10 mg  10 mg Oral Daily Kady Stone MD   10 mg at 11/16/23 0853   â¢ carvedilol (COREG) tablet 3.125 mg  3.125 mg Oral BID WC Kady Stone MD   3.125 mg at 11/16/23 9618   â¢ isosorbide mononitrate (IMDUR) ER tablet 30 mg  30 mg Oral Daily Kady Stone MD   30 mg at 11/16/23 0853   â¢ pravastatin (PRAVACHOL) tablet 40 mg  40 mg Oral Daily Kady Stone MD   40 mg at 11/16/23 0853   â¢ sodium chloride 0.9 % injection 2 mL  2 mL Intracatheter 2 times per day Kady Stone MD   2 mL at 11/16/23 0577   â¢ heparin (porcine) injection 5,000 Units  5,000 Units Subcutaneous 3 times per day Kady Stone MD   5,000 Units at 11/16/23 0522     Current Facility-Administered Medications   Medication Dose Route Frequency Provider Last Rate Last Admin       Physical Exam:    General: Alert, no acute distress  HEENT: Head atraumatic, normocephalic. Moist oral mucus membranes. Sclera non-icteric. Neck: Supple, no JVD. Trachea midline. Chest/Lungs: Normal effort. Symmetrical expansion. Clear to auscultation. No wheezes, rales or rhonchi. CVS: Regular rate and rhythm. S1,S2 well heard. Has no pericardial rub heard. Abdomen: BS well heard. Soft, non tender, non distended. Neuro: No focal neurological deficit. A&O x 3. Skin: Warm, dry. No rashes. Extremities: No clubbing or cyanosis. + edema.       Laboratory Results:  Recent Labs   Lab 11/16/23  0703 11/15/23  0530 11/14/23  1439   SODIUM 145 142 141   POTASSIUM 3.9 4.0 6.3*   CHLORIDE 102 101 102   CO2 31 25 14*   ANIONGAP 16 20* 31*   BUN 36* 65* 123*   CREATININE 10.49* 14.72* 21.85*   GLUCOSE 96 101* 92   CALCIUM 7.4* 7.3* 6.6*   ALBUMIN  --   --  3.2*   MG 1.6*  --  1.6*   AST  --   --  36   GPT  --   --  33   ALKPT  --   --  84   BILIRUBIN  --   --  0.5       Recent Labs   Lab 11/16/23  0703 11/15/23  0530 11/14/23  1439   WBC 3.4* 3.4* 4.4   HGB 10.7* 10.9* 10.7*   HCT 33.3* 33.4* 32.7*   * 114* 130*       No results found    No results found    Urine Panel  No results found    Imaging/Procedures:  CXR  Â   IMPRESSION:  Small right pleural effusion as outlined above  Impression, Plan & Recommendations:    ESRD on HD MWF  Â· AVF for access.    Â· HD today then tomorrow  per schedule          Edema   Â· Likely from missing HD   Â· UF with HD     Hyperkalemia due to non compliance  Â· Should correct with HD     HTN, due to med noncompliance and volume overload, follow with HD  Â· UF with HD     HFrEF EF 65%    Anemia, stable , MARICRUZ as indicated Resident

## 2024-03-21 RX ORDER — ATORVASTATIN CALCIUM 80 MG/1
1 TABLET, FILM COATED ORAL
Qty: 0 | Refills: 0 | DISCHARGE

## 2024-03-21 RX ORDER — METOPROLOL TARTRATE 50 MG
1 TABLET ORAL
Qty: 0 | Refills: 0 | DISCHARGE

## 2024-03-21 RX ORDER — VALSARTAN 80 MG/1
1 TABLET ORAL
Qty: 0 | Refills: 0 | DISCHARGE

## 2024-03-21 RX ORDER — AMLODIPINE BESYLATE 2.5 MG/1
1 TABLET ORAL
Qty: 0 | Refills: 0 | DISCHARGE

## 2024-03-21 RX ORDER — HYDRALAZINE HCL 50 MG
1 TABLET ORAL
Qty: 0 | Refills: 0 | DISCHARGE

## 2024-04-19 ENCOUNTER — EMERGENCY (EMERGENCY)
Facility: HOSPITAL | Age: 79
LOS: 1 days | Discharge: HOME | End: 2024-04-19
Attending: STUDENT IN AN ORGANIZED HEALTH CARE EDUCATION/TRAINING PROGRAM
Payer: MEDICARE

## 2024-04-19 VITALS
TEMPERATURE: 98 F | SYSTOLIC BLOOD PRESSURE: 147 MMHG | HEART RATE: 78 BPM | DIASTOLIC BLOOD PRESSURE: 79 MMHG | RESPIRATION RATE: 16 BRPM | OXYGEN SATURATION: 98 %

## 2024-04-19 VITALS
RESPIRATION RATE: 16 BRPM | DIASTOLIC BLOOD PRESSURE: 79 MMHG | SYSTOLIC BLOOD PRESSURE: 144 MMHG | HEIGHT: 63 IN | OXYGEN SATURATION: 97 % | HEART RATE: 92 BPM | TEMPERATURE: 98 F | WEIGHT: 136.69 LBS

## 2024-04-19 PROBLEM — I10 ESSENTIAL (PRIMARY) HYPERTENSION: Chronic | Status: ACTIVE | Noted: 2022-09-25

## 2024-04-19 LAB
ALBUMIN SERPL ELPH-MCNC: 3.9 G/DL — SIGNIFICANT CHANGE UP (ref 3.5–5)
ALP SERPL-CCNC: 76 U/L — SIGNIFICANT CHANGE UP (ref 40–120)
ALT FLD-CCNC: 22 U/L DA — SIGNIFICANT CHANGE UP (ref 10–60)
ANION GAP SERPL CALC-SCNC: 6 MMOL/L — SIGNIFICANT CHANGE UP (ref 5–17)
APTT BLD: 36 SEC — HIGH (ref 24.5–35.6)
AST SERPL-CCNC: 13 U/L — SIGNIFICANT CHANGE UP (ref 10–40)
BASOPHILS # BLD AUTO: 0.03 K/UL — SIGNIFICANT CHANGE UP (ref 0–0.2)
BASOPHILS NFR BLD AUTO: 0.4 % — SIGNIFICANT CHANGE UP (ref 0–2)
BILIRUB SERPL-MCNC: 0.5 MG/DL — SIGNIFICANT CHANGE UP (ref 0.2–1.2)
BUN SERPL-MCNC: 21 MG/DL — HIGH (ref 7–18)
CALCIUM SERPL-MCNC: 8.8 MG/DL — SIGNIFICANT CHANGE UP (ref 8.4–10.5)
CHLORIDE SERPL-SCNC: 107 MMOL/L — SIGNIFICANT CHANGE UP (ref 96–108)
CO2 SERPL-SCNC: 26 MMOL/L — SIGNIFICANT CHANGE UP (ref 22–31)
CREAT SERPL-MCNC: 0.94 MG/DL — SIGNIFICANT CHANGE UP (ref 0.5–1.3)
EGFR: 61 ML/MIN/1.73M2 — SIGNIFICANT CHANGE UP
EOSINOPHIL # BLD AUTO: 0.06 K/UL — SIGNIFICANT CHANGE UP (ref 0–0.5)
EOSINOPHIL NFR BLD AUTO: 0.8 % — SIGNIFICANT CHANGE UP (ref 0–6)
GLUCOSE SERPL-MCNC: 121 MG/DL — HIGH (ref 70–99)
HCT VFR BLD CALC: 40.3 % — SIGNIFICANT CHANGE UP (ref 34.5–45)
HGB BLD-MCNC: 13.7 G/DL — SIGNIFICANT CHANGE UP (ref 11.5–15.5)
IMM GRANULOCYTES NFR BLD AUTO: 0.4 % — SIGNIFICANT CHANGE UP (ref 0–0.9)
INR BLD: 0.97 RATIO — SIGNIFICANT CHANGE UP (ref 0.85–1.18)
LYMPHOCYTES # BLD AUTO: 1.27 K/UL — SIGNIFICANT CHANGE UP (ref 1–3.3)
LYMPHOCYTES # BLD AUTO: 16.6 % — SIGNIFICANT CHANGE UP (ref 13–44)
MAGNESIUM SERPL-MCNC: 1.6 MG/DL — SIGNIFICANT CHANGE UP (ref 1.6–2.6)
MCHC RBC-ENTMCNC: 30.9 PG — SIGNIFICANT CHANGE UP (ref 27–34)
MCHC RBC-ENTMCNC: 34 GM/DL — SIGNIFICANT CHANGE UP (ref 32–36)
MCV RBC AUTO: 90.8 FL — SIGNIFICANT CHANGE UP (ref 80–100)
MONOCYTES # BLD AUTO: 0.59 K/UL — SIGNIFICANT CHANGE UP (ref 0–0.9)
MONOCYTES NFR BLD AUTO: 7.7 % — SIGNIFICANT CHANGE UP (ref 2–14)
NEUTROPHILS # BLD AUTO: 5.68 K/UL — SIGNIFICANT CHANGE UP (ref 1.8–7.4)
NEUTROPHILS NFR BLD AUTO: 74.1 % — SIGNIFICANT CHANGE UP (ref 43–77)
NRBC # BLD: 0 /100 WBCS — SIGNIFICANT CHANGE UP (ref 0–0)
NT-PROBNP SERPL-SCNC: 229 PG/ML — SIGNIFICANT CHANGE UP (ref 0–450)
PLATELET # BLD AUTO: 238 K/UL — SIGNIFICANT CHANGE UP (ref 150–400)
POTASSIUM SERPL-MCNC: 3.6 MMOL/L — SIGNIFICANT CHANGE UP (ref 3.5–5.3)
POTASSIUM SERPL-SCNC: 3.6 MMOL/L — SIGNIFICANT CHANGE UP (ref 3.5–5.3)
PROT SERPL-MCNC: 7.3 G/DL — SIGNIFICANT CHANGE UP (ref 6–8.3)
PROTHROM AB SERPL-ACNC: 11.1 SEC — SIGNIFICANT CHANGE UP (ref 9.5–13)
RBC # BLD: 4.44 M/UL — SIGNIFICANT CHANGE UP (ref 3.8–5.2)
RBC # FLD: 12.2 % — SIGNIFICANT CHANGE UP (ref 10.3–14.5)
SODIUM SERPL-SCNC: 139 MMOL/L — SIGNIFICANT CHANGE UP (ref 135–145)
TROPONIN I, HIGH SENSITIVITY RESULT: 33 NG/L — SIGNIFICANT CHANGE UP
TSH SERPL-MCNC: 1.15 UU/ML — SIGNIFICANT CHANGE UP (ref 0.34–4.82)
WBC # BLD: 7.66 K/UL — SIGNIFICANT CHANGE UP (ref 3.8–10.5)
WBC # FLD AUTO: 7.66 K/UL — SIGNIFICANT CHANGE UP (ref 3.8–10.5)

## 2024-04-19 PROCEDURE — 83880 ASSAY OF NATRIURETIC PEPTIDE: CPT

## 2024-04-19 PROCEDURE — 93005 ELECTROCARDIOGRAM TRACING: CPT

## 2024-04-19 PROCEDURE — 84484 ASSAY OF TROPONIN QUANT: CPT

## 2024-04-19 PROCEDURE — 85730 THROMBOPLASTIN TIME PARTIAL: CPT

## 2024-04-19 PROCEDURE — 36415 COLL VENOUS BLD VENIPUNCTURE: CPT

## 2024-04-19 PROCEDURE — 84443 ASSAY THYROID STIM HORMONE: CPT

## 2024-04-19 PROCEDURE — 83735 ASSAY OF MAGNESIUM: CPT

## 2024-04-19 PROCEDURE — 85610 PROTHROMBIN TIME: CPT

## 2024-04-19 PROCEDURE — 85025 COMPLETE CBC W/AUTO DIFF WBC: CPT

## 2024-04-19 PROCEDURE — 71045 X-RAY EXAM CHEST 1 VIEW: CPT

## 2024-04-19 PROCEDURE — 71045 X-RAY EXAM CHEST 1 VIEW: CPT | Mod: 26

## 2024-04-19 PROCEDURE — 80053 COMPREHEN METABOLIC PANEL: CPT

## 2024-04-19 PROCEDURE — 99285 EMERGENCY DEPT VISIT HI MDM: CPT

## 2024-04-19 PROCEDURE — 99285 EMERGENCY DEPT VISIT HI MDM: CPT | Mod: 25

## 2024-04-19 RX ORDER — SODIUM CHLORIDE 9 MG/ML
1000 INJECTION INTRAMUSCULAR; INTRAVENOUS; SUBCUTANEOUS ONCE
Refills: 0 | Status: COMPLETED | OUTPATIENT
Start: 2024-04-19 | End: 2024-04-19

## 2024-04-19 RX ADMIN — SODIUM CHLORIDE 1000 MILLILITER(S): 9 INJECTION INTRAMUSCULAR; INTRAVENOUS; SUBCUTANEOUS at 13:27

## 2024-04-19 NOTE — ED PROVIDER NOTE - NS ED ROS FT
Gen: Denies fever, weight loss  HEENT: Denies vision changes, ear pain, epistaxis, sore throat  CV: Denies chest pain; +palpitations  Skin: Denies rash, erythema, color changes  Resp: Denies SOB, cough  Endo: Denies sensitivity to heat, cold, increased urination  GI: Denies abdominal pain, constipation, nausea, vomiting, diarrhea  Msk: Denies back pain, LE swelling, extremity pain  : Denies dysuria, increased frequency  Neuro: Denies LOC, weakness, numbness, tingling  Psych: Denies hx of psych, hallucinations  ROS statement: all other ROS negative except as per HPI

## 2024-04-19 NOTE — ED PROVIDER NOTE - PATIENT PORTAL LINK FT
You can access the FollowMyHealth Patient Portal offered by [facility] by registering at the following website: [email or web address]. By joining [facility]’s FollowMyHealth portal, you will also be able to view your health information using other applications (apps) compatible with our system.

## 2024-04-19 NOTE — ED PROVIDER NOTE - PROGRESS NOTE DETAILS
MD Del Toro: Labs/CXR unactionable. Pt was re-evaluated at bedside, VSS, feeling better overall. Results were discussed with patient as well as return precautions and follow up plan with PCP and/or cardiologist. Time was taken to answer any questions that the patient had before providing them with discharge paperwork.

## 2024-04-19 NOTE — ED PROVIDER NOTE - OBJECTIVE STATEMENT
78-year-old female, past medical history of hypertension, presents to ED complaining of palpitations x 1 week, which was worse last night.  Patient has never had similar symptoms in the past.  Patient states symptoms are not associated with exertion or deep breath.  States saw her cardiologist 2 months ago, had normal stress/echo, and recently had medication change.  Patient denies headache, vision changes, chest pain, shortness of breath, lightheadedness/dizziness, weakness/numbness/tingling, abdominal pain, nausea/vomiting/diarrhea, urinary symptoms, lower extremity edema or any other symptoms at this time.  Patient denies any recent travel/sick contacts.

## 2024-04-19 NOTE — ED PROVIDER NOTE - NSFOLLOWUPINSTRUCTIONS_ED_ALL_ED_FT
Your work up was unremarkable here today.    Please follow up with PCP and/or Cardiologist within 1 week for further management.    Return to ED if develop chest pain, difficult breathing, pass out, fevers/chills, irregular heart rate, or any other new/concerning symptoms.

## 2024-04-19 NOTE — ED PROVIDER NOTE - CLINICAL SUMMARY MEDICAL DECISION MAKING FREE TEXT BOX
78-year-old female, past medical history of hypertension, presents to ED complaining of palpitations x 1 week, which was worse last night. Denies any other symptoms/complaints. Had recent nml stress/echo w/ cardiologist 2 months ago. Reports recent medication change (unsure of which made). VSS. EKG is NSR w/o ischemic changes. Physical exam benign. Plan to check basic labs/electrolytes, troponin, BNP, and CXR.

## 2024-08-16 ENCOUNTER — APPOINTMENT (OUTPATIENT)
Dept: CARE COORDINATION | Facility: HOME HEALTH | Age: 79
End: 2024-08-16

## 2025-02-05 ENCOUNTER — EMERGENCY (EMERGENCY)
Facility: HOSPITAL | Age: 80
LOS: 1 days | Discharge: HOME | End: 2025-02-05
Attending: EMERGENCY MEDICINE
Payer: MEDICARE

## 2025-02-05 VITALS
WEIGHT: 132.06 LBS | HEART RATE: 74 BPM | DIASTOLIC BLOOD PRESSURE: 72 MMHG | TEMPERATURE: 98 F | RESPIRATION RATE: 18 BRPM | HEIGHT: 61 IN | OXYGEN SATURATION: 95 % | SYSTOLIC BLOOD PRESSURE: 177 MMHG

## 2025-02-05 VITALS
OXYGEN SATURATION: 95 % | HEART RATE: 62 BPM | DIASTOLIC BLOOD PRESSURE: 77 MMHG | RESPIRATION RATE: 19 BRPM | TEMPERATURE: 98 F | SYSTOLIC BLOOD PRESSURE: 143 MMHG

## 2025-02-05 LAB
ALBUMIN SERPL ELPH-MCNC: 3.7 G/DL — SIGNIFICANT CHANGE UP (ref 3.5–5)
ALP SERPL-CCNC: 63 U/L — SIGNIFICANT CHANGE UP (ref 40–120)
ALT FLD-CCNC: 23 U/L DA — SIGNIFICANT CHANGE UP (ref 10–60)
ANION GAP SERPL CALC-SCNC: 4 MMOL/L — LOW (ref 5–17)
AST SERPL-CCNC: 17 U/L — SIGNIFICANT CHANGE UP (ref 10–40)
BASOPHILS # BLD AUTO: 0.02 K/UL — SIGNIFICANT CHANGE UP (ref 0–0.2)
BASOPHILS NFR BLD AUTO: 0.3 % — SIGNIFICANT CHANGE UP (ref 0–2)
BILIRUB SERPL-MCNC: 0.4 MG/DL — SIGNIFICANT CHANGE UP (ref 0.2–1.2)
BUN SERPL-MCNC: 20 MG/DL — HIGH (ref 7–18)
CALCIUM SERPL-MCNC: 9.6 MG/DL — SIGNIFICANT CHANGE UP (ref 8.4–10.5)
CHLORIDE SERPL-SCNC: 106 MMOL/L — SIGNIFICANT CHANGE UP (ref 96–108)
CO2 SERPL-SCNC: 31 MMOL/L — SIGNIFICANT CHANGE UP (ref 22–31)
CREAT SERPL-MCNC: 0.91 MG/DL — SIGNIFICANT CHANGE UP (ref 0.5–1.3)
EGFR: 63 ML/MIN/1.73M2 — SIGNIFICANT CHANGE UP
EGFR: 63 ML/MIN/1.73M2 — SIGNIFICANT CHANGE UP
EOSINOPHIL # BLD AUTO: 0.1 K/UL — SIGNIFICANT CHANGE UP (ref 0–0.5)
EOSINOPHIL NFR BLD AUTO: 1.4 % — SIGNIFICANT CHANGE UP (ref 0–6)
GLUCOSE SERPL-MCNC: 117 MG/DL — HIGH (ref 70–99)
HCT VFR BLD CALC: 40.7 % — SIGNIFICANT CHANGE UP (ref 34.5–45)
HGB BLD-MCNC: 13.8 G/DL — SIGNIFICANT CHANGE UP (ref 11.5–15.5)
IMM GRANULOCYTES NFR BLD AUTO: 0.3 % — SIGNIFICANT CHANGE UP (ref 0–0.9)
LIDOCAIN IGE QN: 21 U/L — SIGNIFICANT CHANGE UP (ref 13–75)
LYMPHOCYTES # BLD AUTO: 1.18 K/UL — SIGNIFICANT CHANGE UP (ref 1–3.3)
LYMPHOCYTES # BLD AUTO: 16.8 % — SIGNIFICANT CHANGE UP (ref 13–44)
MAGNESIUM SERPL-MCNC: 1.6 MG/DL — SIGNIFICANT CHANGE UP (ref 1.6–2.6)
MCHC RBC-ENTMCNC: 31.3 PG — SIGNIFICANT CHANGE UP (ref 27–34)
MCHC RBC-ENTMCNC: 33.9 G/DL — SIGNIFICANT CHANGE UP (ref 32–36)
MCV RBC AUTO: 92.3 FL — SIGNIFICANT CHANGE UP (ref 80–100)
MONOCYTES # BLD AUTO: 0.43 K/UL — SIGNIFICANT CHANGE UP (ref 0–0.9)
MONOCYTES NFR BLD AUTO: 6.1 % — SIGNIFICANT CHANGE UP (ref 2–14)
NEUTROPHILS # BLD AUTO: 5.26 K/UL — SIGNIFICANT CHANGE UP (ref 1.8–7.4)
NEUTROPHILS NFR BLD AUTO: 75.1 % — SIGNIFICANT CHANGE UP (ref 43–77)
NRBC # BLD: 0 /100 WBCS — SIGNIFICANT CHANGE UP (ref 0–0)
NRBC BLD-RTO: 0 /100 WBCS — SIGNIFICANT CHANGE UP (ref 0–0)
NT-PROBNP SERPL-SCNC: 234 PG/ML — SIGNIFICANT CHANGE UP (ref 0–450)
PLATELET # BLD AUTO: 244 K/UL — SIGNIFICANT CHANGE UP (ref 150–400)
POTASSIUM SERPL-MCNC: 3.8 MMOL/L — SIGNIFICANT CHANGE UP (ref 3.5–5.3)
POTASSIUM SERPL-SCNC: 3.8 MMOL/L — SIGNIFICANT CHANGE UP (ref 3.5–5.3)
PROT SERPL-MCNC: 7 G/DL — SIGNIFICANT CHANGE UP (ref 6–8.3)
RBC # BLD: 4.41 M/UL — SIGNIFICANT CHANGE UP (ref 3.8–5.2)
RBC # FLD: 12.2 % — SIGNIFICANT CHANGE UP (ref 10.3–14.5)
SODIUM SERPL-SCNC: 141 MMOL/L — SIGNIFICANT CHANGE UP (ref 135–145)
TROPONIN I, HIGH SENSITIVITY RESULT: 5.5 NG/L — SIGNIFICANT CHANGE UP
WBC # BLD: 7.01 K/UL — SIGNIFICANT CHANGE UP (ref 3.8–10.5)
WBC # FLD AUTO: 7.01 K/UL — SIGNIFICANT CHANGE UP (ref 3.8–10.5)

## 2025-02-05 PROCEDURE — 96375 TX/PRO/DX INJ NEW DRUG ADDON: CPT

## 2025-02-05 PROCEDURE — 76705 ECHO EXAM OF ABDOMEN: CPT

## 2025-02-05 PROCEDURE — 83690 ASSAY OF LIPASE: CPT

## 2025-02-05 PROCEDURE — 99284 EMERGENCY DEPT VISIT MOD MDM: CPT

## 2025-02-05 PROCEDURE — 85025 COMPLETE CBC W/AUTO DIFF WBC: CPT

## 2025-02-05 PROCEDURE — 96374 THER/PROPH/DIAG INJ IV PUSH: CPT

## 2025-02-05 PROCEDURE — 83880 ASSAY OF NATRIURETIC PEPTIDE: CPT

## 2025-02-05 PROCEDURE — 83735 ASSAY OF MAGNESIUM: CPT

## 2025-02-05 PROCEDURE — 80053 COMPREHEN METABOLIC PANEL: CPT

## 2025-02-05 PROCEDURE — 84484 ASSAY OF TROPONIN QUANT: CPT

## 2025-02-05 PROCEDURE — 76705 ECHO EXAM OF ABDOMEN: CPT | Mod: 26

## 2025-02-05 PROCEDURE — 99285 EMERGENCY DEPT VISIT HI MDM: CPT | Mod: 25

## 2025-02-05 PROCEDURE — 93005 ELECTROCARDIOGRAM TRACING: CPT

## 2025-02-05 PROCEDURE — 36415 COLL VENOUS BLD VENIPUNCTURE: CPT

## 2025-02-05 RX ORDER — LIDOCAINE HYDROCHLORIDE 20 MG/ML
10 JELLY TOPICAL ONCE
Refills: 0 | Status: COMPLETED | OUTPATIENT
Start: 2025-02-05 | End: 2025-02-05

## 2025-02-05 RX ORDER — KETOROLAC TROMETHAMINE 30 MG/ML
15 INJECTION, SOLUTION INTRAMUSCULAR; INTRAVENOUS ONCE
Refills: 0 | Status: DISCONTINUED | OUTPATIENT
Start: 2025-02-05 | End: 2025-02-05

## 2025-02-05 RX ORDER — MAGNESIUM, ALUMINUM HYDROXIDE 200-200 MG
30 TABLET,CHEWABLE ORAL ONCE
Refills: 0 | Status: COMPLETED | OUTPATIENT
Start: 2025-02-05 | End: 2025-02-05

## 2025-02-05 RX ADMIN — KETOROLAC TROMETHAMINE 15 MILLIGRAM(S): 30 INJECTION, SOLUTION INTRAMUSCULAR; INTRAVENOUS at 15:40

## 2025-02-05 RX ADMIN — LIDOCAINE HYDROCHLORIDE 10 MILLILITER(S): 20 JELLY TOPICAL at 17:23

## 2025-02-05 RX ADMIN — Medication 20 MILLIGRAM(S): at 13:38

## 2025-02-05 RX ADMIN — Medication 125 MILLILITER(S): at 13:44

## 2025-02-05 RX ADMIN — Medication 30 MILLILITER(S): at 17:25

## 2025-04-26 NOTE — ED PROVIDER NOTE - PHYSICAL EXAMINATION
PHYSICAL EXAM:  GENERAL: non-toxic appearing; in no respiratory distress  HEENT: Atraumatic, Normocephalic, PERRL, EOMs intact b/l w/out deficits, no conjunctival pallor, MMM  NECK: No JVD; FROM  CHEST/LUNG: CTAB no wheezes/rhonchi/rales  HEART: RRR no murmur/gallops/rubs  ABDOMEN: +BS, soft, NT, ND  EXTREMITIES: No LE edema, +2 radial pulses b/l, +2 DP/PT pulses b/l  MUSCULOSKELETAL: FROM of all 4 extremities  NERVOUS SYSTEM:  A&Ox3, No motor deficits or sensory deficits; CNII-XII intact; no focal neurologic deficits  SKIN:  No new rashes
Male